# Patient Record
Sex: MALE | Race: OTHER | HISPANIC OR LATINO | URBAN - METROPOLITAN AREA
[De-identification: names, ages, dates, MRNs, and addresses within clinical notes are randomized per-mention and may not be internally consistent; named-entity substitution may affect disease eponyms.]

---

## 2020-02-16 ENCOUNTER — INPATIENT (INPATIENT)
Facility: HOSPITAL | Age: 40
LOS: 8 days | Discharge: NURSING, REHAB OR LONG-TERM CARE | End: 2020-02-25
Attending: INTERNAL MEDICINE | Admitting: INTERNAL MEDICINE
Payer: MEDICAID

## 2020-02-16 VITALS
HEART RATE: 128 BPM | SYSTOLIC BLOOD PRESSURE: 160 MMHG | DIASTOLIC BLOOD PRESSURE: 88 MMHG | RESPIRATION RATE: 18 BRPM | TEMPERATURE: 98 F | OXYGEN SATURATION: 98 %

## 2020-02-16 LAB
ALBUMIN SERPL ELPH-MCNC: 3.6 G/DL — SIGNIFICANT CHANGE UP (ref 3.5–5.2)
ALP SERPL-CCNC: 132 U/L — HIGH (ref 30–115)
ALT FLD-CCNC: 29 U/L — SIGNIFICANT CHANGE UP (ref 0–41)
ANION GAP SERPL CALC-SCNC: 20 MMOL/L — HIGH (ref 7–14)
ANISOCYTOSIS BLD QL: SLIGHT — SIGNIFICANT CHANGE UP
APPEARANCE UR: CLEAR — SIGNIFICANT CHANGE UP
AST SERPL-CCNC: 79 U/L — HIGH (ref 0–41)
BASOPHILS # BLD AUTO: 0.05 K/UL — SIGNIFICANT CHANGE UP (ref 0–0.2)
BASOPHILS NFR BLD AUTO: 0.9 % — SIGNIFICANT CHANGE UP (ref 0–1)
BILIRUB SERPL-MCNC: 4.7 MG/DL — HIGH (ref 0.2–1.2)
BILIRUB UR-MCNC: NEGATIVE — SIGNIFICANT CHANGE UP
BUN SERPL-MCNC: 5 MG/DL — LOW (ref 10–20)
CALCIUM SERPL-MCNC: 9.1 MG/DL — SIGNIFICANT CHANGE UP (ref 8.5–10.1)
CHLORIDE SERPL-SCNC: 101 MMOL/L — SIGNIFICANT CHANGE UP (ref 98–110)
CO2 SERPL-SCNC: 20 MMOL/L — SIGNIFICANT CHANGE UP (ref 17–32)
COLOR SPEC: YELLOW — SIGNIFICANT CHANGE UP
CREAT SERPL-MCNC: 0.7 MG/DL — SIGNIFICANT CHANGE UP (ref 0.7–1.5)
DIFF PNL FLD: NEGATIVE — SIGNIFICANT CHANGE UP
EOSINOPHIL # BLD AUTO: 0.05 K/UL — SIGNIFICANT CHANGE UP (ref 0–0.7)
EOSINOPHIL NFR BLD AUTO: 0.9 % — SIGNIFICANT CHANGE UP (ref 0–8)
ETHANOL SERPL-MCNC: <10 MG/DL — SIGNIFICANT CHANGE UP
GIANT PLATELETS BLD QL SMEAR: PRESENT — SIGNIFICANT CHANGE UP
GLUCOSE SERPL-MCNC: 92 MG/DL — SIGNIFICANT CHANGE UP (ref 70–99)
GLUCOSE UR QL: NEGATIVE — SIGNIFICANT CHANGE UP
HCT VFR BLD CALC: 37.3 % — LOW (ref 42–52)
HGB BLD-MCNC: 12.1 G/DL — LOW (ref 14–18)
KETONES UR-MCNC: ABNORMAL
LACTATE SERPL-SCNC: 3.9 MMOL/L — HIGH (ref 0.7–2)
LEUKOCYTE ESTERASE UR-ACNC: NEGATIVE — SIGNIFICANT CHANGE UP
LIDOCAIN IGE QN: 35 U/L — SIGNIFICANT CHANGE UP (ref 7–60)
LYMPHOCYTES # BLD AUTO: 0.5 K/UL — LOW (ref 1.2–3.4)
LYMPHOCYTES # BLD AUTO: 8.7 % — LOW (ref 20.5–51.1)
MACROCYTES BLD QL: SLIGHT — SIGNIFICANT CHANGE UP
MAGNESIUM SERPL-MCNC: 1.3 MG/DL — LOW (ref 1.8–2.4)
MANUAL SMEAR VERIFICATION: SIGNIFICANT CHANGE UP
MCHC RBC-ENTMCNC: 24.1 PG — LOW (ref 27–31)
MCHC RBC-ENTMCNC: 32.4 G/DL — SIGNIFICANT CHANGE UP (ref 32–37)
MCV RBC AUTO: 74.3 FL — LOW (ref 80–94)
MICROCYTES BLD QL: SLIGHT — SIGNIFICANT CHANGE UP
MONOCYTES # BLD AUTO: 0.15 K/UL — SIGNIFICANT CHANGE UP (ref 0.1–0.6)
MONOCYTES NFR BLD AUTO: 2.6 % — SIGNIFICANT CHANGE UP (ref 1.7–9.3)
NEUTROPHILS # BLD AUTO: 5.02 K/UL — SIGNIFICANT CHANGE UP (ref 1.4–6.5)
NEUTROPHILS NFR BLD AUTO: 86.9 % — HIGH (ref 42.2–75.2)
NITRITE UR-MCNC: NEGATIVE — SIGNIFICANT CHANGE UP
PH UR: 6.5 — SIGNIFICANT CHANGE UP (ref 5–8)
PLAT MORPH BLD: NORMAL — SIGNIFICANT CHANGE UP
PLATELET # BLD AUTO: 102 K/UL — LOW (ref 130–400)
POTASSIUM SERPL-MCNC: 3.8 MMOL/L — SIGNIFICANT CHANGE UP (ref 3.5–5)
POTASSIUM SERPL-SCNC: 3.8 MMOL/L — SIGNIFICANT CHANGE UP (ref 3.5–5)
PROT SERPL-MCNC: 8.1 G/DL — HIGH (ref 6–8)
PROT UR-MCNC: NEGATIVE — SIGNIFICANT CHANGE UP
RBC # BLD: 5.02 M/UL — SIGNIFICANT CHANGE UP (ref 4.7–6.1)
RBC # FLD: 21.2 % — HIGH (ref 11.5–14.5)
RBC BLD AUTO: ABNORMAL
SMUDGE CELLS # BLD: PRESENT — SIGNIFICANT CHANGE UP
SODIUM SERPL-SCNC: 141 MMOL/L — SIGNIFICANT CHANGE UP (ref 135–146)
SP GR SPEC: 1.04 — HIGH (ref 1.01–1.02)
UROBILINOGEN FLD QL: SIGNIFICANT CHANGE UP
WBC # BLD: 5.78 K/UL — SIGNIFICANT CHANGE UP (ref 4.8–10.8)
WBC # FLD AUTO: 5.78 K/UL — SIGNIFICANT CHANGE UP (ref 4.8–10.8)

## 2020-02-16 PROCEDURE — 99285 EMERGENCY DEPT VISIT HI MDM: CPT

## 2020-02-16 PROCEDURE — 76705 ECHO EXAM OF ABDOMEN: CPT | Mod: 26

## 2020-02-16 PROCEDURE — 74177 CT ABD & PELVIS W/CONTRAST: CPT | Mod: 26

## 2020-02-16 PROCEDURE — 93010 ELECTROCARDIOGRAM REPORT: CPT

## 2020-02-16 RX ORDER — DIAZEPAM 5 MG
5 TABLET ORAL ONCE
Refills: 0 | Status: DISCONTINUED | OUTPATIENT
Start: 2020-02-16 | End: 2020-02-16

## 2020-02-16 RX ORDER — SODIUM CHLORIDE 9 MG/ML
1000 INJECTION INTRAMUSCULAR; INTRAVENOUS; SUBCUTANEOUS ONCE
Refills: 0 | Status: COMPLETED | OUTPATIENT
Start: 2020-02-16 | End: 2020-02-16

## 2020-02-16 RX ORDER — MAGNESIUM SULFATE 500 MG/ML
2 VIAL (ML) INJECTION ONCE
Refills: 0 | Status: COMPLETED | OUTPATIENT
Start: 2020-02-16 | End: 2020-02-16

## 2020-02-16 RX ORDER — THIAMINE MONONITRATE (VIT B1) 100 MG
200 TABLET ORAL ONCE
Refills: 0 | Status: COMPLETED | OUTPATIENT
Start: 2020-02-16 | End: 2020-02-16

## 2020-02-16 RX ADMIN — Medication 200 MILLIGRAM(S): at 22:37

## 2020-02-16 RX ADMIN — SODIUM CHLORIDE 1000 MILLILITER(S): 9 INJECTION INTRAMUSCULAR; INTRAVENOUS; SUBCUTANEOUS at 20:45

## 2020-02-16 RX ADMIN — SODIUM CHLORIDE 1000 MILLILITER(S): 9 INJECTION INTRAMUSCULAR; INTRAVENOUS; SUBCUTANEOUS at 21:12

## 2020-02-16 RX ADMIN — Medication 2 GRAM(S): at 23:07

## 2020-02-16 RX ADMIN — SODIUM CHLORIDE 1000 MILLILITER(S): 9 INJECTION INTRAMUSCULAR; INTRAVENOUS; SUBCUTANEOUS at 21:45

## 2020-02-16 RX ADMIN — Medication 5 MILLIGRAM(S): at 19:45

## 2020-02-16 RX ADMIN — SODIUM CHLORIDE 1000 MILLILITER(S): 9 INJECTION INTRAMUSCULAR; INTRAVENOUS; SUBCUTANEOUS at 20:12

## 2020-02-16 RX ADMIN — Medication 50 GRAM(S): at 22:37

## 2020-02-16 NOTE — ED PROVIDER NOTE - CLINICAL SUMMARY MEDICAL DECISION MAKING FREE TEXT BOX
Patient remained hemodynamically stable in ED, improved well, as per sign out, US findings noted, and patient is admitted to Medicine for further evaluation.

## 2020-02-16 NOTE — ED PROVIDER NOTE - NS ED ROS FT
Constitutional: (-) fever  Eyes/ENT: (-) blurry vision, (-) epistaxis  Cardiovascular: (-) chest pain, (-) syncope  Respiratory: (-) cough, (-) shortness of breath  Gastrointestinal: (+) vomiting, (+) diarrhea, (+) epigastric  Musculoskeletal: (-) neck pain, (-) back pain, (-) joint pain  Integumentary: (-) rash, (-) edema  Neurological: (+) headache, (-) altered mental status  Psychiatric: (-) hallucinations  Allergic/Immunologic: (-) pruritus

## 2020-02-16 NOTE — ED PROVIDER NOTE - OBJECTIVE STATEMENT
The pt is a 39y M, undomiciled, w/ hx of alcohol abuse presenting with epigastric abdominal pain x1 week. Pain radiates to the back. Pt also endorses daily emesis and diarrhea. Reports drinking a 12 pack of beer daily plus hard liquor. Endorses headache. Denies fever, chest pain, SOB. He is not aware of any other medical hx but has not seen a doctor in many years. No surgical hx. No allergies. Never smoker.

## 2020-02-16 NOTE — ED PROVIDER NOTE - PHYSICAL EXAMINATION
Vital Signs: I have reviewed the initial vital signs. .   Constitutional: well-nourished, appears stated age, no acute distress  Cardiovascular: (+) tachycardic, regular rhythm, well-perfused extremities  Respiratory: unlabored respiratory effort, clear to auscultation bilaterally  Gastrointestinal: soft, (+) tender abdomen in the epigastric region and RLQ  Musculoskeletal: supple neck, no lower extremity edema  Integumentary: warm, dry, no rash  Neurologic: awake, alert, extremities’ motor and sensory functions grossly intact  Psychiatric: appropriate mood, appropriate affect

## 2020-02-16 NOTE — ED PROVIDER NOTE - PROGRESS NOTE DETAILS
ct with only fatty/cirrhotic liver however pt has elevated lft. will add on ruq. mag  2g ordered. s/o Dr. Hoffman re-eval and re-asses

## 2020-02-17 LAB
ALBUMIN SERPL ELPH-MCNC: 3.2 G/DL — LOW (ref 3.5–5.2)
ALP SERPL-CCNC: 110 U/L — SIGNIFICANT CHANGE UP (ref 30–115)
ALT FLD-CCNC: 25 U/L — SIGNIFICANT CHANGE UP (ref 0–41)
ANION GAP SERPL CALC-SCNC: 15 MMOL/L — HIGH (ref 7–14)
APTT BLD: 49.1 SEC — HIGH (ref 27–39.2)
AST SERPL-CCNC: 74 U/L — HIGH (ref 0–41)
BASOPHILS # BLD AUTO: 0.04 K/UL — SIGNIFICANT CHANGE UP (ref 0–0.2)
BASOPHILS NFR BLD AUTO: 1 % — SIGNIFICANT CHANGE UP (ref 0–1)
BILIRUB DIRECT SERPL-MCNC: 1.5 MG/DL — HIGH (ref 0–0.2)
BILIRUB INDIRECT FLD-MCNC: 3.1 MG/DL — HIGH (ref 0.2–1.2)
BILIRUB SERPL-MCNC: 4.6 MG/DL — HIGH (ref 0.2–1.2)
BLD GP AB SCN SERPL QL: SIGNIFICANT CHANGE UP
BUN SERPL-MCNC: 5 MG/DL — LOW (ref 10–20)
CALCIUM SERPL-MCNC: 8 MG/DL — LOW (ref 8.5–10.1)
CHLORIDE SERPL-SCNC: 103 MMOL/L — SIGNIFICANT CHANGE UP (ref 98–110)
CO2 SERPL-SCNC: 20 MMOL/L — SIGNIFICANT CHANGE UP (ref 17–32)
CREAT SERPL-MCNC: 0.6 MG/DL — LOW (ref 0.7–1.5)
EOSINOPHIL # BLD AUTO: 0.03 K/UL — SIGNIFICANT CHANGE UP (ref 0–0.7)
EOSINOPHIL NFR BLD AUTO: 0.8 % — SIGNIFICANT CHANGE UP (ref 0–8)
GGT SERPL-CCNC: 135 U/L — HIGH (ref 1–40)
GLUCOSE SERPL-MCNC: 114 MG/DL — HIGH (ref 70–99)
HCT VFR BLD CALC: 34.3 % — LOW (ref 42–52)
HGB BLD-MCNC: 11.3 G/DL — LOW (ref 14–18)
IMM GRANULOCYTES NFR BLD AUTO: 0.5 % — HIGH (ref 0.1–0.3)
INR BLD: 1.73 RATIO — HIGH (ref 0.65–1.3)
IRON SATN MFR SERPL: 299 UG/DL — HIGH (ref 35–150)
LYMPHOCYTES # BLD AUTO: 0.79 K/UL — LOW (ref 1.2–3.4)
LYMPHOCYTES # BLD AUTO: 19.8 % — LOW (ref 20.5–51.1)
MAGNESIUM SERPL-MCNC: 2.4 MG/DL — SIGNIFICANT CHANGE UP (ref 1.8–2.4)
MCHC RBC-ENTMCNC: 24.2 PG — LOW (ref 27–31)
MCHC RBC-ENTMCNC: 32.9 G/DL — SIGNIFICANT CHANGE UP (ref 32–37)
MCV RBC AUTO: 73.6 FL — LOW (ref 80–94)
MONOCYTES # BLD AUTO: 0.35 K/UL — SIGNIFICANT CHANGE UP (ref 0.1–0.6)
MONOCYTES NFR BLD AUTO: 8.8 % — SIGNIFICANT CHANGE UP (ref 1.7–9.3)
NEUTROPHILS # BLD AUTO: 2.75 K/UL — SIGNIFICANT CHANGE UP (ref 1.4–6.5)
NEUTROPHILS NFR BLD AUTO: 69.1 % — SIGNIFICANT CHANGE UP (ref 42.2–75.2)
NRBC # BLD: 0 /100 WBCS — SIGNIFICANT CHANGE UP (ref 0–0)
PHOSPHATE SERPL-MCNC: 2.7 MG/DL — SIGNIFICANT CHANGE UP (ref 2.1–4.9)
PLATELET # BLD AUTO: 64 K/UL — LOW (ref 130–400)
POTASSIUM SERPL-MCNC: 3.3 MMOL/L — LOW (ref 3.5–5)
POTASSIUM SERPL-SCNC: 3.3 MMOL/L — LOW (ref 3.5–5)
PROT SERPL-MCNC: 7.1 G/DL — SIGNIFICANT CHANGE UP (ref 6–8)
PROTHROM AB SERPL-ACNC: 19.9 SEC — HIGH (ref 9.95–12.87)
RBC # BLD: 4.66 M/UL — LOW (ref 4.7–6.1)
RBC # FLD: 21 % — HIGH (ref 11.5–14.5)
SODIUM SERPL-SCNC: 138 MMOL/L — SIGNIFICANT CHANGE UP (ref 135–146)
TIBC SERPL-MCNC: <316 UG/DL — SIGNIFICANT CHANGE UP (ref 220–430)
TRANSFERRIN SERPL-MCNC: 273 MG/DL — SIGNIFICANT CHANGE UP (ref 200–360)
UIBC SERPL-MCNC: <17 UG/DL — LOW (ref 110–370)
WBC # BLD: 3.98 K/UL — LOW (ref 4.8–10.8)
WBC # FLD AUTO: 3.98 K/UL — LOW (ref 4.8–10.8)

## 2020-02-17 PROCEDURE — 99223 1ST HOSP IP/OBS HIGH 75: CPT

## 2020-02-17 PROCEDURE — 99223 1ST HOSP IP/OBS HIGH 75: CPT | Mod: AI

## 2020-02-17 RX ORDER — PANTOPRAZOLE SODIUM 20 MG/1
40 TABLET, DELAYED RELEASE ORAL
Refills: 0 | Status: DISCONTINUED | OUTPATIENT
Start: 2020-02-17 | End: 2020-02-17

## 2020-02-17 RX ORDER — FOLIC ACID 0.8 MG
1 TABLET ORAL DAILY
Refills: 0 | Status: DISCONTINUED | OUTPATIENT
Start: 2020-02-17 | End: 2020-02-25

## 2020-02-17 RX ORDER — THIAMINE MONONITRATE (VIT B1) 100 MG
100 TABLET ORAL DAILY
Refills: 0 | Status: COMPLETED | OUTPATIENT
Start: 2020-02-17 | End: 2020-02-19

## 2020-02-17 RX ORDER — MAGNESIUM SULFATE 500 MG/ML
2 VIAL (ML) INJECTION
Refills: 0 | Status: COMPLETED | OUTPATIENT
Start: 2020-02-17 | End: 2020-02-17

## 2020-02-17 RX ORDER — PANTOPRAZOLE SODIUM 20 MG/1
40 TABLET, DELAYED RELEASE ORAL
Refills: 0 | Status: DISCONTINUED | OUTPATIENT
Start: 2020-02-17 | End: 2020-02-25

## 2020-02-17 RX ORDER — LACTULOSE 10 G/15ML
15 SOLUTION ORAL
Refills: 0 | Status: DISCONTINUED | OUTPATIENT
Start: 2020-02-17 | End: 2020-02-25

## 2020-02-17 RX ORDER — POTASSIUM CHLORIDE 20 MEQ
20 PACKET (EA) ORAL
Refills: 0 | Status: COMPLETED | OUTPATIENT
Start: 2020-02-17 | End: 2020-02-17

## 2020-02-17 RX ORDER — ENOXAPARIN SODIUM 100 MG/ML
40 INJECTION SUBCUTANEOUS DAILY
Refills: 0 | Status: DISCONTINUED | OUTPATIENT
Start: 2020-02-17 | End: 2020-02-24

## 2020-02-17 RX ORDER — SODIUM CHLORIDE 9 MG/ML
1000 INJECTION, SOLUTION INTRAVENOUS
Refills: 0 | Status: DISCONTINUED | OUTPATIENT
Start: 2020-02-17 | End: 2020-02-25

## 2020-02-17 RX ORDER — THIAMINE MONONITRATE (VIT B1) 100 MG
100 TABLET ORAL ONCE
Refills: 0 | Status: COMPLETED | OUTPATIENT
Start: 2020-02-17 | End: 2020-02-17

## 2020-02-17 RX ADMIN — Medication 100 MILLIGRAM(S): at 11:37

## 2020-02-17 RX ADMIN — Medication 2 MILLIGRAM(S): at 13:20

## 2020-02-17 RX ADMIN — Medication 1 TABLET(S): at 11:37

## 2020-02-17 RX ADMIN — PANTOPRAZOLE SODIUM 40 MILLIGRAM(S): 20 TABLET, DELAYED RELEASE ORAL at 18:12

## 2020-02-17 RX ADMIN — Medication 2 MILLIGRAM(S): at 06:04

## 2020-02-17 RX ADMIN — Medication 20 MILLIEQUIVALENT(S): at 11:36

## 2020-02-17 RX ADMIN — Medication 20 MILLIEQUIVALENT(S): at 13:20

## 2020-02-17 RX ADMIN — Medication 2 MILLIGRAM(S): at 01:29

## 2020-02-17 RX ADMIN — Medication 2 MILLIGRAM(S): at 18:12

## 2020-02-17 RX ADMIN — Medication 20 MILLIEQUIVALENT(S): at 18:12

## 2020-02-17 RX ADMIN — Medication 50 GRAM(S): at 04:00

## 2020-02-17 RX ADMIN — SODIUM CHLORIDE 75 MILLILITER(S): 9 INJECTION, SOLUTION INTRAVENOUS at 04:32

## 2020-02-17 RX ADMIN — Medication 50 GRAM(S): at 02:00

## 2020-02-17 RX ADMIN — Medication 2 MILLIGRAM(S): at 10:13

## 2020-02-17 RX ADMIN — Medication 1 MILLIGRAM(S): at 11:37

## 2020-02-17 RX ADMIN — Medication 2 MILLIGRAM(S): at 04:11

## 2020-02-17 NOTE — CONSULT NOTE ADULT - SUBJECTIVE AND OBJECTIVE BOX
39-year-old,  Nauruan speaking, male, undomiciled, unemployed, single, without children, without known medical history, no formal past psychiatric history - admitted to medicine for abdominal pain and alcohol withdrawal with imaging findings consistent with cirrhosis.     Interview via pacific  Nauruan language telephonic interpretation ID #[id number]    Subjective   Patient endorses drinking 10-12 beers, daily, for the last 10 years. Endorses subsequent unemployment, financial stressors, and having no where to live as a result of this lifestyle, which he endorses as being unable to stop despite no desire to continue to consume alcohol. He endorses depressed mood for last decade as a consequence of feelings of dependency and hopelessness regarding his compulsive alcohol use. He denies suicidality at time. Endorses sleep difficulties. Denies sx of psychosis.     Objective     CIWA calculated to be 4.     Vital Signs Last 24 Hrs  T(C): 36.6 (17 Feb 2020 08:00), Max: 37 (17 Feb 2020 04:41)  T(F): 97.9 (17 Feb 2020 08:00), Max: 98.6 (17 Feb 2020 04:41)  HR: 102 (17 Feb 2020 08:00) (91 - 128)  BP: 136/77 (17 Feb 2020 08:00) (136/77 - 160/88)  RR: 18 (17 Feb 2020 08:00) (18 - 18)  SpO2: 98% (17 Feb 2020 08:00) (98% - 100%)    CT Abdomen and Pelvis w/ IV Cont (02.16.20 @ 20:19) >  IMPRESSION:   1.  No CT evidence of acute abdominopelvic pathology.  2.  Severe fatty infiltrated nodular cirrhotic liver.    Mental status: Fairly groomed, cooperative with interview, Nauruan speaking, linear thought process, depressed appearing, and reports depressed mood, no evidence of perceptual abnormalities, oriented to person, place, time, and situation, no suicidality.

## 2020-02-17 NOTE — H&P ADULT - NSHPPHYSICALEXAM_GEN_ALL_CORE
General : Patient is alert, no acute distress, no agitation noted   Eyes / Neuro : Mild bilateral tremor, no diaphoresis, no nystagmus, PERLLA, EOMI, + Jaundice    Lungs : Clear to auscultation bilaterally, unlabored breathing, on room air   Cardiovascular : Regular S1S2  Abdomen : Soft with mild epigastric tenderness, + Hepatosplenomegaly, no sequelae of chronic liver disease, no guarding   Extremities : No edema, no cyanosis

## 2020-02-17 NOTE — CONSULT NOTE ADULT - ASSESSMENT
39-year-old,  Jordanian speaking, male, undomiciled, unemployed, single, without children, without known medical history, no formal past psychiatric history - admitted to medicine for abdominal pain and alcohol withdrawal with imaging findings consistent with cirrhosis.     *Based on assessment, patient appears to have alcohol use disorder, severe, currently detoxifying.   *Ativan taper appears to be controlling symptoms, recommend continuing. Would monitor vitals at least q4 hours to monitor for signs of withdrawal (last documented vitals 7 hours ago with tachycardia to 102).   *Patient is amenable to rehabilitation after detoxification. If possible, Jordanian speaking rehabilitation will be most effective, patient is not English speaking.   *Recommend social work evaluation.   *Frequent CIWA Checks, patient is high risk given duration of use and amount consumed.

## 2020-02-17 NOTE — H&P ADULT - ATTENDING COMMENTS
**Please speak to the pt in Korean, it is his preferred language.     38 YO M with a PMH of EtOH abuse and homelessness who presents to the hospital with a c/o epigastric pain for the past x 1 wk. Described as "strong", radiates towards left lower back, and waxes/wanes. Denies any hematemesis, hematuria, black/bloody stools, fever/chills, rashes, CP, or SOB. Of note, pt drinks 10 beers + 3 cups of vodka daily and his lst drink was yesterday in the afternoon. In the ED, pt given IVFs (NS), Diazepam, thiamine/folate, and Mg2+. Physical exam shows pt in NAD. VSS, afebrile. No tongue fasciculations. B/L upper extremity fine tremors present. CTA B/L with no W/C/R. RRR. ABD is soft and non-tender, hepatomegaly present. LEs without swelling. Labs and radiology as above. Epigastric pain likely due to alcoholic gastritis. PPI. Anti-emetics PRN. EtOH abstinence counseling. EtOH abuse with risk for withdrawal. CIWA protocol. Ativan PRN. Long-acting benzos contraindicated due to liver cirrhosis. IVFs (LR). Thiamine/Folate deficiency. Replace. Mixed picture: HAGMA + metabolic alkalosis. From lactate and starvation ketosis. IVFs (LR). Trend BMP. Hypomagnesemia. Replace. Elevated tbili and thrombocytopenia, likely due to EtOH cirrhosis. Send coag panel to calculate Maddreys Discriminant Function. IVFs (LR). Trend LFTs. Send CLD labs. Microcytic anemia. Send anemia work-up. HX of homelessness. Social consult. GI and DVT PPX. Rest as per above note.

## 2020-02-17 NOTE — CHART NOTE - NSCHARTNOTEFT_GEN_A_CORE
used: Betty [id number] ID NO      He presents with diffuse abdominal discomfort and on the Imaging studies has been diagnosed  with NEW cirrhosis.    -He notes of vomiting 2 days ago and it was bloody  -Sometimes also has the bloody bowel movements.  -Had one episode of confusion requiring the Hospital admission  Hepatic Encephalopathy??  -No tremors, no Caput Medusae, No spider angiomata.  -MELD Na Score,  used: Betty [id number] ID NO      He presents with diffuse abdominal discomfort and on the Imaging studies has been diagnosed  with NEW cirrhosis, Most Likely Alcoholic Cirrhosis.  -Drinks 12 beer everyday and few shots of Vodka everyday since last 10 years,   last drink yesterday currently being treated for the Alcohol Withdrawal.     -He notes of vomiting 2 days ago and it was bloody  -Sometimes also has the bloody bowel movements.  -Had one episode of confusion requiring the Hospital admission  Hepatic Encephalopathy??  -No tremors, no Caput Medusae, No spider angiomata.  -MELD Na Score, 19    PLAN:  -Keep 2 18G IV lines  -Active Type and Screen  -Follow up the LFTs  -Avoid Hepatotoxic Meds  -IV PPI  -GI consult  -Will need Diagnostic EGD and Possible Colonoscopy  -Monitor H and H  -For episode of Confusion will start Lactulose  -Monitor CIWA  -Follow up with the CLD workup  used: Betty [id number] ID NO      He presents with diffuse abdominal discomfort and on the Imaging studies has been diagnosed  with NEW cirrhosis, Most Likely Alcoholic Cirrhosis.  -Drinks 12 beer everyday and few shots of Vodka everyday since last 10 years,   last drink yesterday currently being treated for the Alcohol Withdrawal.     -He notes of vomiting 2 days ago and it was bloody  -Sometimes also has the bloody bowel movements.  -Had one episode of confusion requiring the Hospital admission  Hepatic Encephalopathy??  -No tremors, no Caput Medusae, No spider angiomata.  -MELD Na Score, 19    PLAN:  -Keep 2 18G IV lines  -Active Type and Screen  -Follow up the LFTs  -Avoid Hepatotoxic Meds  -IV PPI  -GI consult  -Will need Diagnostic EGD and Possible Colonoscopy  -Monitor H and H  -For episode of Confusion will start Lactulose  -Monitor CIWA  -Follow up with the CLD workup  -In light of Increased Indirect bilirubin and cirrhosis in young patient  will keep Drew disease in a differential Diagnosis.  -Will get the Hemolysis panel

## 2020-02-17 NOTE — CONSULT NOTE ADULT - ASSESSMENT
39 year old male patient, homeless, active alcohol abuse, no other known past medical history presenting for epigastric pain and vomiting for the last 2 days.    # Abdominal pain/diarrhea/vomiting/elevated LFTS:  Alcoholic liver cirrhosis (alcohol, thrombocytopenia, elevated LFTS, AST/ALT ratio reversal, US abdomen showed nodular liver)  possible acute alcoholic hepatitis (abdominal pain, no leucocytosis but alcoholic might have pancytopenia, vomiting, active alcohol abuse, subjective fever)  Mental status intact, Coagulopathy +  MELD score 18  MADDREY score: 40.9      Rec:  Please perform a CLD work up which includes HAV IgM/IgG, HBsAg, HBsAb, HBcAb IgM/IgG, HCV Ab, Anti HEV, Serum Ferritin, Transferrin Saturation, Ceruloplasmin level, MARCUS, SMA, gamma globulin, AMA.  Please trend liver enzymes and INR daily.  Monitor electrolytes, mental status.    Alcohol abuse:  Ativan protocol for alcohol withdrawal.  RL at 75cc/hr  Folate, MVI and thiamine  Alcohol cessation  c/w DASH diet.    # Vomiting with blood tinge : Likely Aziza Chau vs PUD vs erosive esophagitis vs erosive gastritis/duodenitis   THONG: brown stools  Hemodynamically stable  No active bleeding  Slight drop in HG    Rec:  Needs EGD after medical stabilization for screening varices also   Start on protonix 40mg BID  Trend CBC q12H  can c/w DASH diet      # Esophageal varices  needs screening EGD    #No ascites on US abdomen    No Known H/O HE      Coagulopathy:  Monitor INR    HRS: No H/O HRS and Cr normal    # HCC screening:  get AFP now  CT abdomen with con: No lesions in liver for HCC    # Lifestyle modifications  Calorie intake 25-40 Kcal/kg/day  Protein intake: 1.2-1.5 gm/kg/day  Avoid smoking, alcohol, NSAIDS.    #Vaccinations:  Please f/u in clinic for being uptodate with HAV/HBV/Influenza/Pneumococcal vaccines. 39 year old male patient, homeless, active alcohol abuse, no other known past medical history presenting for epigastric pain and vomiting for the last 2 days.    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  Please trend liver enzymes, CMP, INR daily.  Monitor electrolytes, mental status.  will start Prednisolone 40mg daily for alcoholic hepatitis.  R/O infection Blood cx, CXR (UA is neg)    Alcohol abuse:  Ativan protocol for alcohol withdrawal.  RL at 75cc/hr  Folate, MVI and thiamine  Alcohol cessation  c/w DASH diet.    # Vomiting with blood tinge : Likely Aziza Chau vs PUD vs erosive esophagitis vs erosive gastritis/duodenitis   THONG: brown stools  Hemodynamically stable  No active bleeding  Slight drop in HG    Rec:  Needs EGD after medical stabilization before discharge  Start on protonix 40mg BID  Trend CBC q12H  can c/w DASH diet      # Esophageal varices  needs screening EGD    #No ascites on US abdomen    No Known H/O HE      Coagulopathy:  Monitor INR    HRS: No H/O HRS and Cr normal    # HCC screening:  get AFP now  CT abdomen with con: No lesions in liver for HCC    # Lifestyle modifications  Calorie intake 25-40 Kcal/kg/day  Protein intake: 1.2-1.5 gm/kg/day  Avoid smoking, alcohol, NSAIDS.    #Vaccinations:  Please f/u in clinic for being uptodate with HAV/HBV/Influenza/Pneumococcal vaccines.

## 2020-02-17 NOTE — CONSULT NOTE ADULT - SUBJECTIVE AND OBJECTIVE BOX
Gastroenterology Consultation:    Patient is a 39y old  Male who presents with a chief complaint of Alcohol abuse (17 Feb 2020 01:26)      Admitted on: 02-17-20    HPI:    39 year old male patient, chronic alcohol abuse, homeless, no other known past medical history presenting for epigastric pain.   The patient has been drinking a 12 pack beer daily as well as hard liquor (3 cups of Vodka daily)   He states that he has been experiencing epigastric pain for a week, radiating to the left side of the back, very severe, patient unsure of the quality of the pain, accompanied with subjective fever as well as diarrhea, bilious emesis and nausea. He otherwise denies hematemesis, hematochezia, melena, dyspnea, cough. He reports a chest pressure sensation that has been present for a month and that is exacerbated by PO intake.     In the ED , patient was found to be in alcohol withdrawal, tachycardic up to 128, and hypertensive, no fever, and CIWA protocol initiated, CIWA score of 4 noted, patient was started on Ativan taper, Thiamine, Folic acid and multivitamins (17 Feb 2020 01:26)      Prior EGD/Colonoscopy: No reports in system (Never had one)      PAST MEDICAL & SURGICAL HISTORY:  Chronic alcohol abuse  No significant past surgical history      FAMILY HISTORY:  No pertinent family history in first degree relatives      Social History:  Alcohol abuse    Home Medications:    MEDICATIONS  (STANDING):  enoxaparin Injectable 40 milliGRAM(s) SubCutaneous daily  folic acid 1 milliGRAM(s) Oral daily  lactated ringers. 1000 milliLiter(s) (75 mL/Hr) IV Continuous <Continuous>  lactulose Syrup 15 Gram(s) Oral two times a day  LORazepam   Injectable 2 milliGRAM(s) IV Push every 4 hours  LORazepam   Injectable 1.5 milliGRAM(s) IV Push every 4 hours  LORazepam   Injectable   IV Push   multivitamin 1 Tablet(s) Oral daily  pantoprazole  Injectable 40 milliGRAM(s) IV Push two times a day  potassium chloride    Tablet ER 20 milliEquivalent(s) Oral every 2 hours  thiamine 100 milliGRAM(s) Oral daily  thiamine Injectable 100 milliGRAM(s) IntraMuscular once    MEDICATIONS  (PRN):      Allergies  Allergy Status Unknown      Review of Systems:   Constitutional:  No Fever, No Chills  ENT/Mouth:  No Hearing Changes,  No Difficulty Swallowing  Eyes:  No Eye Pain, No Vision Changes  Cardiovascular:  No Chest Pain, No Palpitations  Respiratory:  No Cough, No Dyspnea  Gastrointestinal:  As described in HPI  Musculoskeletal:  No Joint Swelling, No Back Pain  Skin:  No Skin Lesions, No Jaundice  Neuro:  No Syncope, No Dizziness  Heme/Lymph:  No Bruising, No Bleeding.          Physical Examination:  T(C): 36.6 (02-17-20 @ 08:00), Max: 37 (02-17-20 @ 04:41)  HR: 102 (02-17-20 @ 08:00) (91 - 128)  BP: 136/77 (02-17-20 @ 08:00) (136/77 - 160/88)  RR: 18 (02-17-20 @ 08:00) (18 - 18)  SpO2: 98% (02-17-20 @ 08:00) (98% - 100%)        Constitutional: No acute distress.  Eyes:. Conjunctivae are clear, Sclera is non-icteric.  Ears Nose and Throat: The external ears are normal appearing,  Oral mucosa is pink and moist.  Respiratory:  No signs of respiratory distress. Lung sounds are clear bilaterally.  Cardiovascular:  S1 S2, Regular rate and rhythm.  GI: Abdomen is soft, symmetric, and non-tender without distention. There are no visible lesions. Bowel sounds are present and normoactive in all four quadrants. No masses, hepatomegaly, or splenomegaly are noted.   Neuro: No Tremor, No involuntary movements  Skin: No rashes, No Jaundice.          Data: (reviewed by attending)                        11.3   3.98  )-----------( 64       ( 17 Feb 2020 07:32 )             34.3     Hgb Trend:  11.3  02-17-20 @ 07:32  12.1  02-16-20 @ 19:50      02-17    138  |  103  |  5<L>  ----------------------------<  114<H>  3.3<L>   |  20  |  0.6<L>    Ca    8.0<L>      17 Feb 2020 07:32  Phos  2.7     02-17  Mg     2.4     02-17    TPro  7.1  /  Alb  3.2<L>  /  TBili  4.6<H>  /  DBili  1.5<H>  /  AST  74<H>  /  ALT  25  /  AlkPhos  110  02-17    Liver panel trend:  TBili 4.6   /   AST 74   /   ALT 25   /   AlkP 110   /   Tptn 7.1   /   Alb 3.2    /   DBili 1.5      02-17  TBili 4.7   /   AST 79   /   ALT 29   /   AlkP 132   /   Tptn 8.1   /   Alb 3.6    /   DBili --      02-16      PT/INR - ( 17 Feb 2020 07:32 )   PT: 19.90 sec;   INR: 1.73 ratio         PTT - ( 17 Feb 2020 07:32 )  PTT:49.1 sec        Radiology:(reviewed by attending)  CT Abdomen and Pelvis w/ IV Cont:   EXAM:  CT ABDOMEN AND PELVIS IC            PROCEDURE DATE:  02/16/2020            INTERPRETATION:  CLINICAL STATEMENT: Epigastric abdominal pain. Alcohol abuse.    TECHNIQUE: Contiguous axial CT images were obtained from the lower chest to the pubic symphysis following administration of 100cc Optiray 320 intravenous contrast.  Oral contrast was not administered.  Reformatted images in the coronal and sagittal planes were acquired.    COMPARISON CT: None.    OTHER STUDIES USED FOR CORRELATION: None.       FINDINGS:    LOWER CHEST: Unremarkable.    HEPATOBILIARY: Diffusely fatty infiltrated liver with nodular surface contour suggestive of cirrhosis.    SPLEEN: Unremarkable.    PANCREAS: Unremarkable.    ADRENAL GLANDS: Unremarkable.    KIDNEYS: Unremarkable.    ABDOMINOPELVIC NODES: Unremarkable.    PELVIC ORGANS: Unremarkable.    PERITONEUM/MESENTERY/BOWEL: Unremarkable.    BONES/SOFT TISSUES: Mild osteophytosis in the lower thoracic spine.    OTHER: Small fat-containing umbilical hernia      IMPRESSION:   1.  No CT evidence of acute abdominopelvic pathology.  2.  Severe fatty infiltrated nodular cirrhotic liver.                  ANNEMARIE GODOY M.D., ATTENDING RADIOLOGIST  This document has been electronically signed. Feb 16 2020  9:20PM             (02-16-20 @ 20:19)    US Abdomen Limited:   EXAM:  US ABDOMEN LIMITED            PROCEDURE DATE:  02/16/2020            INTERPRETATION:  CLINICAL HISTORY: Right upper quadrant pain.    COMPARISON: CT dated 2/16/2020    PROCEDURE: Ultrasound of the right upper quadrant was performed.    FINDINGS:    LIVER: Nodular contour of the liver without definite hepatic mass.. No focal mass.    GALLBLADDER/BILIARY TREE: The gallbladder is partially obscured without definite cholelithiasis or gallbladder wall thickening. There is a negative sonographic Rizvi's sign. The common bile duct measures 5 mm, which is normal.     PANCREAS: Obscured by overlying bowel gas.    KIDNEY:  Right kidney measures 11.5 cm in length. No hydronephrosis, calculi or solid mass.    AORTA/IVC:  Visualized proximal portions unremarkable.    ASCITES:  None.    IMPRESSION:    Nodular contour of the liver raising a question of cirrhosis.    Suboptimally imaged gallbladder without definite cholelithiasis or wall thickening.                  MOLLY JHA M.D., ATTENDING RADIOLOGIST  This document has been electronically signed. Feb 16 2020 10:45PM             (02-16-20 @ 22:41) Gastroenterology Consultation:    Patient is a 39y old  Male who presents with a chief complaint of Alcohol abuse (17 Feb 2020 01:26)      Admitted on: 02-17-20    HPI:    39 year old male patient, homeless, active alcohol abuse, no other known past medical history presenting for epigastric pain and vomiting. Patient states that he drinks around 10-12 beers and 3 cups of vodka daily everyday for the last 10 yrs and for the last 2 days he is been having epigastric pain, sharp, constant, radiating to RUQ and worsen with food and alcohol. He has a/s nausea, yellow color vomiting 2-3 episodes and he saw some specks of blood and the last episode was yesterday. His last BM was yesterday which was loose and brown. He otherwise denies hematochezia, melena, dyspnea, cough.  In the ED , patient was found to be in alcohol withdrawal, tachycardic up to 128, and hypertensive, no fever, and CIWA protocol initiated, CIWA score of 4 noted.      Prior EGD/Colonoscopy: No reports in system (Never had one)      PAST MEDICAL & SURGICAL HISTORY:  Chronic alcohol abuse  No significant past surgical history      FAMILY HISTORY:  No pertinent family history in first degree relatives      Social History:  Alcohol abuse    Home Medications: None    MEDICATIONS  (STANDING):  enoxaparin Injectable 40 milliGRAM(s) SubCutaneous daily  folic acid 1 milliGRAM(s) Oral daily  lactated ringers. 1000 milliLiter(s) (75 mL/Hr) IV Continuous <Continuous>  lactulose Syrup 15 Gram(s) Oral two times a day  LORazepam   Injectable 2 milliGRAM(s) IV Push every 4 hours  LORazepam   Injectable 1.5 milliGRAM(s) IV Push every 4 hours  LORazepam   Injectable   IV Push   multivitamin 1 Tablet(s) Oral daily  pantoprazole  Injectable 40 milliGRAM(s) IV Push two times a day  potassium chloride    Tablet ER 20 milliEquivalent(s) Oral every 2 hours  thiamine 100 milliGRAM(s) Oral daily  thiamine Injectable 100 milliGRAM(s) IntraMuscular once    MEDICATIONS  (PRN):      Allergies  Allergy Status Unknown      Review of Systems:   Constitutional:  Chills+  ENT/Mouth:  No Hearing Changes,  No Difficulty Swallowing  Eyes:  No Eye Pain, No Vision Changes  Cardiovascular:  No Chest Pain, No Palpitations  Respiratory:  No Cough, No Dyspnea  Gastrointestinal:  As described in HPI  Musculoskeletal:  No Joint Swelling, No Back Pain  Skin: Jaundice+  Neuro:  No Syncope, No Dizziness  Heme/Lymph:  No Bruising          Physical Examination:  T(C): 36.6 (02-17-20 @ 08:00), Max: 37 (02-17-20 @ 04:41)  HR: 102 (02-17-20 @ 08:00) (91 - 128)  BP: 136/77 (02-17-20 @ 08:00) (136/77 - 160/88)  RR: 18 (02-17-20 @ 08:00) (18 - 18)  SpO2: 98% (02-17-20 @ 08:00) (98% - 100%)        Constitutional: Looks restless, disheveled   Eyes: Sclera is icteric.  Ears Nose and Throat: The external ears are normal appearing,  Oral mucosa is pink and moist.  Respiratory:  No signs of respiratory distress. Lung sounds are clear bilaterally.  Cardiovascular:  S1 S2, Regular rate and rhythm.  GI: Abdomen is soft, symmetric, and non-tender without distention. There are no visible lesions. Bowel sounds are present and normoactive in all four quadrants. No masses, hepatomegaly, or splenomegaly are noted.   THONG: brown stools  Neuro: Fine tremors noted on extending hand but no asterixis, AAO X 3  Skin: Jaundice.          Data: (reviewed by attending)                        11.3   3.98  )-----------( 64       ( 17 Feb 2020 07:32 )             34.3     Hgb Trend:  11.3  02-17-20 @ 07:32  12.1  02-16-20 @ 19:50      02-17    138  |  103  |  5<L>  ----------------------------<  114<H>  3.3<L>   |  20  |  0.6<L>    Ca    8.0<L>      17 Feb 2020 07:32  Phos  2.7     02-17  Mg     2.4     02-17    TPro  7.1  /  Alb  3.2<L>  /  TBili  4.6<H>  /  DBili  1.5<H>  /  AST  74<H>  /  ALT  25  /  AlkPhos  110  02-17    Liver panel trend:  TBili 4.6   /   AST 74   /   ALT 25   /   AlkP 110   /   Tptn 7.1   /   Alb 3.2    /   DBili 1.5      02-17  TBili 4.7   /   AST 79   /   ALT 29   /   AlkP 132   /   Tptn 8.1   /   Alb 3.6    /   DBili --      02-16      PT/INR - ( 17 Feb 2020 07:32 )   PT: 19.90 sec;   INR: 1.73 ratio         PTT - ( 17 Feb 2020 07:32 )  PTT:49.1 sec        Radiology:(reviewed by attending)  CT Abdomen and Pelvis w/ IV Cont:   EXAM:  CT ABDOMEN AND PELVIS IC            PROCEDURE DATE:  02/16/2020            INTERPRETATION:  CLINICAL STATEMENT: Epigastric abdominal pain. Alcohol abuse.    TECHNIQUE: Contiguous axial CT images were obtained from the lower chest to the pubic symphysis following administration of 100cc Optiray 320 intravenous contrast.  Oral contrast was not administered.  Reformatted images in the coronal and sagittal planes were acquired.    COMPARISON CT: None.    OTHER STUDIES USED FOR CORRELATION: None.       FINDINGS:    LOWER CHEST: Unremarkable.    HEPATOBILIARY: Diffusely fatty infiltrated liver with nodular surface contour suggestive of cirrhosis.    SPLEEN: Unremarkable.    PANCREAS: Unremarkable.    ADRENAL GLANDS: Unremarkable.    KIDNEYS: Unremarkable.    ABDOMINOPELVIC NODES: Unremarkable.    PELVIC ORGANS: Unremarkable.    PERITONEUM/MESENTERY/BOWEL: Unremarkable.    BONES/SOFT TISSUES: Mild osteophytosis in the lower thoracic spine.    OTHER: Small fat-containing umbilical hernia      IMPRESSION:   1.  No CT evidence of acute abdominopelvic pathology.  2.  Severe fatty infiltrated nodular cirrhotic liver.                  ANNEMARIE GODOY M.D., ATTENDING RADIOLOGIST  This document has been electronically signed. Feb 16 2020  9:20PM             (02-16-20 @ 20:19)    US Abdomen Limited:   EXAM:  US ABDOMEN LIMITED            PROCEDURE DATE:  02/16/2020            INTERPRETATION:  CLINICAL HISTORY: Right upper quadrant pain.    COMPARISON: CT dated 2/16/2020    PROCEDURE: Ultrasound of the right upper quadrant was performed.    FINDINGS:    LIVER: Nodular contour of the liver without definite hepatic mass.. No focal mass.    GALLBLADDER/BILIARY TREE: The gallbladder is partially obscured without definite cholelithiasis or gallbladder wall thickening. There is a negative sonographic Rizvi's sign. The common bile duct measures 5 mm, which is normal.     PANCREAS: Obscured by overlying bowel gas.    KIDNEY:  Right kidney measures 11.5 cm in length. No hydronephrosis, calculi or solid mass.    AORTA/IVC:  Visualized proximal portions unremarkable.    ASCITES:  None.    IMPRESSION:    Nodular contour of the liver raising a question of cirrhosis.    Suboptimally imaged gallbladder without definite cholelithiasis or wall thickening.                  MOLLY JHA M.D., ATTENDING RADIOLOGIST  This document has been electronically signed. Feb 16 2020 10:45PM             (02-16-20 @ 22:41) Gastroenterology Consultation:    Patient is a 39y old  Male who presents with a chief complaint of Alcohol abuse (17 Feb 2020 01:26)      Admitted on: 02-17-20    HPI:    39 year old male patient, homeless, active alcohol abuse, no other known past medical history presenting for epigastric pain and vomiting. Patient states that he drinks around 10-12 beers and 3 cups of vodka daily everyday for the last 10 yrs and for the last 2 days he is been having epigastric pain, sharp, constant, radiating to RUQ and worsen with food and alcohol. He has a/s nausea, yellow color vomiting 2-3 episodes and he saw some specks of blood and the last episode was yesterday. His last BM was yesterday which was loose and brown. He otherwise denies hematochezia, melena, dyspnea, cough.  In the ED , patient was found to be in alcohol withdrawal, tachycardic up to 128, and hypertensive, no fever, and CIWA protocol initiated, CIWA score of 4 noted.      Prior EGD/Colonoscopy: No reports in system (Never had one)      PAST MEDICAL & SURGICAL HISTORY:  Chronic alcohol abuse  No significant past surgical history      FAMILY HISTORY:  No pertinent family history in first degree relatives      Social History:  Alcohol abuse    Home Medications: None    MEDICATIONS  (STANDING):  enoxaparin Injectable 40 milliGRAM(s) SubCutaneous daily  folic acid 1 milliGRAM(s) Oral daily  lactated ringers. 1000 milliLiter(s) (75 mL/Hr) IV Continuous <Continuous>  lactulose Syrup 15 Gram(s) Oral two times a day  LORazepam   Injectable 2 milliGRAM(s) IV Push every 4 hours  LORazepam   Injectable 1.5 milliGRAM(s) IV Push every 4 hours  LORazepam   Injectable   IV Push   multivitamin 1 Tablet(s) Oral daily  pantoprazole  Injectable 40 milliGRAM(s) IV Push two times a day  potassium chloride    Tablet ER 20 milliEquivalent(s) Oral every 2 hours  thiamine 100 milliGRAM(s) Oral daily  thiamine Injectable 100 milliGRAM(s) IntraMuscular once          Allergies  Allergy Status Unknown      Review of Systems:   Constitutional:  Chills+  ENT/Mouth:  No Hearing Changes,  No Difficulty Swallowing  Eyes:  No Eye Pain, No Vision Changes  Cardiovascular:  No Chest Pain, No Palpitations  Respiratory:  No Cough, No Dyspnea  Gastrointestinal:  As described in HPI  Musculoskeletal:  No Joint Swelling, No Back Pain  Skin: Jaundice+  Neuro:  No Syncope, No Dizziness  Heme/Lymph:  No Bruising          Physical Examination:  T(C): 36.6 (02-17-20 @ 08:00), Max: 37 (02-17-20 @ 04:41)  HR: 102 (02-17-20 @ 08:00) (91 - 128)  BP: 136/77 (02-17-20 @ 08:00) (136/77 - 160/88)  RR: 18 (02-17-20 @ 08:00) (18 - 18)  SpO2: 98% (02-17-20 @ 08:00) (98% - 100%)        Constitutional: Looks restless, disheveled   Eyes: Sclera is icteric.  Ears Nose and Throat: The external ears are normal appearing,  Oral mucosa is pink and moist.  Respiratory:  No signs of respiratory distress. Lung sounds are clear bilaterally.  Cardiovascular:  S1 S2, Regular rate and rhythm.  GI: Abdomen is soft, symmetric, and non-tender without distention. There are no visible lesions. Bowel sounds are present and normoactive in all four quadrants. No masses, hepatomegaly, or splenomegaly are noted.   THONG: brown stools  Neuro: Fine tremors noted on extending hand but no asterixis, AAO X 3  Skin: Jaundice.          Data: (reviewed by attending)                        11.3   3.98  )-----------( 64       ( 17 Feb 2020 07:32 )             34.3     Hgb Trend:  11.3  02-17-20 @ 07:32  12.1  02-16-20 @ 19:50      02-17    138  |  103  |  5<L>  ----------------------------<  114<H>  3.3<L>   |  20  |  0.6<L>    Ca    8.0<L>      17 Feb 2020 07:32  Phos  2.7     02-17  Mg     2.4     02-17    TPro  7.1  /  Alb  3.2<L>  /  TBili  4.6<H>  /  DBili  1.5<H>  /  AST  74<H>  /  ALT  25  /  AlkPhos  110  02-17    Liver panel trend:  TBili 4.6   /   AST 74   /   ALT 25   /   AlkP 110   /   Tptn 7.1   /   Alb 3.2    /   DBili 1.5      02-17  TBili 4.7   /   AST 79   /   ALT 29   /   AlkP 132   /   Tptn 8.1   /   Alb 3.6    /   DBili --      02-16      PT/INR - ( 17 Feb 2020 07:32 )   PT: 19.90 sec;   INR: 1.73 ratio         PTT - ( 17 Feb 2020 07:32 )  PTT:49.1 sec        Radiology:(reviewed by attending)  CT Abdomen and Pelvis w/ IV Cont:   EXAM:  CT ABDOMEN AND PELVIS IC            PROCEDURE DATE:  02/16/2020            INTERPRETATION:  CLINICAL STATEMENT: Epigastric abdominal pain. Alcohol abuse.    TECHNIQUE: Contiguous axial CT images were obtained from the lower chest to the pubic symphysis following administration of 100cc Optiray 320 intravenous contrast.  Oral contrast was not administered.  Reformatted images in the coronal and sagittal planes were acquired.    COMPARISON CT: None.    OTHER STUDIES USED FOR CORRELATION: None.       FINDINGS:    LOWER CHEST: Unremarkable.    HEPATOBILIARY: Diffusely fatty infiltrated liver with nodular surface contour suggestive of cirrhosis.    SPLEEN: Unremarkable.    PANCREAS: Unremarkable.    ADRENAL GLANDS: Unremarkable.    KIDNEYS: Unremarkable.    ABDOMINOPELVIC NODES: Unremarkable.    PELVIC ORGANS: Unremarkable.    PERITONEUM/MESENTERY/BOWEL: Unremarkable.    BONES/SOFT TISSUES: Mild osteophytosis in the lower thoracic spine.    OTHER: Small fat-containing umbilical hernia      IMPRESSION:   1.  No CT evidence of acute abdominopelvic pathology.  2.  Severe fatty infiltrated nodular cirrhotic liver.                  ANNEMARIE GODOY M.D., ATTENDING RADIOLOGIST  This document has been electronically signed. Feb 16 2020  9:20PM             (02-16-20 @ 20:19)    US Abdomen Limited:   EXAM:  US ABDOMEN LIMITED            PROCEDURE DATE:  02/16/2020            INTERPRETATION:  CLINICAL HISTORY: Right upper quadrant pain.    COMPARISON: CT dated 2/16/2020    PROCEDURE: Ultrasound of the right upper quadrant was performed.    FINDINGS:    LIVER: Nodular contour of the liver without definite hepatic mass.. No focal mass.    GALLBLADDER/BILIARY TREE: The gallbladder is partially obscured without definite cholelithiasis or gallbladder wall thickening. There is a negative sonographic Rizvi's sign. The common bile duct measures 5 mm, which is normal.     PANCREAS: Obscured by overlying bowel gas.    KIDNEY:  Right kidney measures 11.5 cm in length. No hydronephrosis, calculi or solid mass.    AORTA/IVC:  Visualized proximal portions unremarkable.    ASCITES:  None.    IMPRESSION:    Nodular contour of the liver raising a question of cirrhosis.    Suboptimally imaged gallbladder without definite cholelithiasis or wall thickening.                  MOLLY JHA M.D., ATTENDING RADIOLOGIST  This document has been electronically signed. Feb 16 2020 10:45PM             (02-16-20 @ 22:41)

## 2020-02-17 NOTE — H&P ADULT - HISTORY OF PRESENT ILLNESS
39 year old male patient, chronic alcohol abuse, homeless, no other past medical history presenting for epigastric pain.   The patient has been drinking a 12 pack beer daily as well as hard liquor (3 cups of Vodka daily)   He states that he has been experiencing epigastric pain for a week, radiating to the left side of the back, very severe, patient unsure of the quality of the pain, accompanied with subjective fever as well as diarrhea, bilious emesis and nausea. He otherwise denies hematemesis, hematochezia, melena, dyspnea, cough. He reports a chest pressure sensation that has been present for a month and that is exacerbated by PO intake.     In the ED , patient was found to be in alcohol withdrawal, tachycardic up to 128, and hypertensive, no fever, and CIWA protocol initiated, CIWA score of 4 noted, patient was started on Ativan taper, Thiamine, Folic acid and multivitamins

## 2020-02-17 NOTE — H&P ADULT - ASSESSMENT
39 year old male patient, chronic alcohol abuse, homeless presenting for epigastric pain.    # Acute alcohol withdrawal   - CIWA protocol with Ativan taper   - Thiamine 100 mg daily, Folic acid 1 mg daily, multivitamin   - F/U urine tox screen   - Detox consult   - Monitor vital signs q4h for 24 h  - Aspiration and fall precautions. Maintain on bedrest for now   - IVF supplementation with LR @ 75 mL/h     # Hypomagnesemia   - 2g IV MgSO4 ordered   - F/U AM Mg level     # Findings of cirrhosis with nodular contour of the liver on US   - Most likely related to chronic alcohol abuse but remaining etiologies should be ruled out as well  - Will order [facility] workup : MARCUS, AMA, ASMA, Liver kidney Ag, hepatitis panel, ceruloplasmin, alpha 1 antitrypsin, iron studies with ferritin, Ig levels   - F/U coagulation studies , thrombocytopenia noted, no hypoalbuminemia   - Outpatient follow up at liver clinic     # Miscellaneous   - DVT prophylaxis : Lovenox   - GI prophylaxis : Protonix 40 mg daily   - Diet : DASH TLC   - Activity : Bedrest  - Full code

## 2020-02-18 LAB
A1AT SERPL-MCNC: 121 MG/DL — SIGNIFICANT CHANGE UP (ref 90–200)
ALBUMIN SERPL ELPH-MCNC: 3.1 G/DL — LOW (ref 3.5–5.2)
ALBUMIN SERPL ELPH-MCNC: 3.2 G/DL — LOW (ref 3.5–5.2)
ALP SERPL-CCNC: 124 U/L — HIGH (ref 30–115)
ALP SERPL-CCNC: 125 U/L — HIGH (ref 30–115)
ALT FLD-CCNC: 27 U/L — SIGNIFICANT CHANGE UP (ref 0–41)
ALT FLD-CCNC: 28 U/L — SIGNIFICANT CHANGE UP (ref 0–41)
ANA TITR SER: NEGATIVE — SIGNIFICANT CHANGE UP
ANION GAP SERPL CALC-SCNC: 14 MMOL/L — SIGNIFICANT CHANGE UP (ref 7–14)
AST SERPL-CCNC: 78 U/L — HIGH (ref 0–41)
AST SERPL-CCNC: 78 U/L — HIGH (ref 0–41)
BILIRUB DIRECT SERPL-MCNC: 1.1 MG/DL — HIGH (ref 0–0.2)
BILIRUB INDIRECT FLD-MCNC: 1.8 MG/DL — HIGH (ref 0.2–1.2)
BILIRUB SERPL-MCNC: 2.9 MG/DL — HIGH (ref 0.2–1.2)
BILIRUB SERPL-MCNC: 2.9 MG/DL — HIGH (ref 0.2–1.2)
BUN SERPL-MCNC: <3 MG/DL — LOW (ref 10–20)
CALCIUM SERPL-MCNC: 8.4 MG/DL — LOW (ref 8.5–10.1)
CERULOPLASMIN SERPL-MCNC: 15 MG/DL — SIGNIFICANT CHANGE UP (ref 15–30)
CHLORIDE SERPL-SCNC: 106 MMOL/L — SIGNIFICANT CHANGE UP (ref 98–110)
CO2 SERPL-SCNC: 20 MMOL/L — SIGNIFICANT CHANGE UP (ref 17–32)
CREAT SERPL-MCNC: 0.6 MG/DL — LOW (ref 0.7–1.5)
DIR ANTIGLOB POLYSPECIFIC INTERPRETATION: SIGNIFICANT CHANGE UP
FERRITIN SERPL-MCNC: 36 NG/ML — SIGNIFICANT CHANGE UP (ref 30–400)
GLUCOSE SERPL-MCNC: 116 MG/DL — HIGH (ref 70–99)
HAV IGG SER QL IA: REACTIVE
HBV CORE AB SER-ACNC: SIGNIFICANT CHANGE UP
HBV CORE IGM SER-ACNC: SIGNIFICANT CHANGE UP
HBV E AB SER-ACNC: NEGATIVE — SIGNIFICANT CHANGE UP
HBV E AG SER-ACNC: NEGATIVE — SIGNIFICANT CHANGE UP
HBV SURFACE AB SER-ACNC: SIGNIFICANT CHANGE UP
HBV SURFACE AG SER-ACNC: SIGNIFICANT CHANGE UP
HCT VFR BLD CALC: 34.7 % — LOW (ref 42–52)
HCV AB S/CO SERPL IA: 0.22 S/CO — SIGNIFICANT CHANGE UP (ref 0–0.99)
HCV AB SERPL-IMP: SIGNIFICANT CHANGE UP
HGB BLD-MCNC: 11.2 G/DL — LOW (ref 14–18)
IGA FLD-MCNC: 933 MG/DL — HIGH (ref 84–499)
IGG FLD-MCNC: 1912 MG/DL — HIGH (ref 610–1660)
IGM SERPL-MCNC: 114 MG/DL — SIGNIFICANT CHANGE UP (ref 35–242)
INR BLD: 1.63 RATIO — HIGH (ref 0.65–1.3)
KAPPA LC SER QL IFE: 5.42 MG/DL — HIGH (ref 0.33–1.94)
KAPPA/LAMBDA FREE LIGHT CHAIN RATIO, SERUM: 0.94 RATIO — SIGNIFICANT CHANGE UP (ref 0.26–1.65)
LAMBDA LC SER QL IFE: 5.76 MG/DL — HIGH (ref 0.57–2.63)
LDH SERPL L TO P-CCNC: 243 U/L — HIGH (ref 50–242)
MCHC RBC-ENTMCNC: 23.7 PG — LOW (ref 27–31)
MCHC RBC-ENTMCNC: 32.3 G/DL — SIGNIFICANT CHANGE UP (ref 32–37)
MCV RBC AUTO: 73.5 FL — LOW (ref 80–94)
MITOCHONDRIA AB SER-ACNC: SIGNIFICANT CHANGE UP
NRBC # BLD: 0 /100 WBCS — SIGNIFICANT CHANGE UP (ref 0–0)
PLATELET # BLD AUTO: 73 K/UL — LOW (ref 130–400)
POTASSIUM SERPL-MCNC: 3.6 MMOL/L — SIGNIFICANT CHANGE UP (ref 3.5–5)
POTASSIUM SERPL-SCNC: 3.6 MMOL/L — SIGNIFICANT CHANGE UP (ref 3.5–5)
PROT SERPL-MCNC: 7.2 G/DL — SIGNIFICANT CHANGE UP (ref 6–8)
PROT SERPL-MCNC: 7.3 G/DL — SIGNIFICANT CHANGE UP (ref 6–8)
PROTHROM AB SERPL-ACNC: 18.8 SEC — HIGH (ref 9.95–12.87)
RBC # BLD: 4.67 M/UL — LOW (ref 4.7–6.1)
RBC # BLD: 4.72 M/UL — SIGNIFICANT CHANGE UP (ref 4.7–6.1)
RBC # FLD: 21.1 % — HIGH (ref 11.5–14.5)
RETICS #: 103.2 K/UL — SIGNIFICANT CHANGE UP (ref 25–125)
RETICS/RBC NFR: 2.2 % — HIGH (ref 0.5–1.5)
SMOOTH MUSCLE AB SER-ACNC: ABNORMAL
SODIUM SERPL-SCNC: 140 MMOL/L — SIGNIFICANT CHANGE UP (ref 135–146)
TRIGL SERPL-MCNC: 76 MG/DL — SIGNIFICANT CHANGE UP (ref 10–149)
WBC # BLD: 4.11 K/UL — LOW (ref 4.8–10.8)
WBC # FLD AUTO: 4.11 K/UL — LOW (ref 4.8–10.8)

## 2020-02-18 PROCEDURE — 99233 SBSQ HOSP IP/OBS HIGH 50: CPT

## 2020-02-18 PROCEDURE — 71045 X-RAY EXAM CHEST 1 VIEW: CPT | Mod: 26

## 2020-02-18 RX ORDER — PREDNISOLONE 5 MG
40 TABLET ORAL DAILY
Refills: 0 | Status: DISCONTINUED | OUTPATIENT
Start: 2020-02-18 | End: 2020-02-18

## 2020-02-18 RX ORDER — INFLUENZA VIRUS VACCINE 15; 15; 15; 15 UG/.5ML; UG/.5ML; UG/.5ML; UG/.5ML
0.5 SUSPENSION INTRAMUSCULAR ONCE
Refills: 0 | Status: DISCONTINUED | OUTPATIENT
Start: 2020-02-18 | End: 2020-02-25

## 2020-02-18 RX ADMIN — Medication 100 MILLIGRAM(S): at 12:11

## 2020-02-18 RX ADMIN — Medication 40 MILLIGRAM(S): at 12:14

## 2020-02-18 RX ADMIN — Medication 1.5 MILLIGRAM(S): at 06:06

## 2020-02-18 RX ADMIN — Medication 1.5 MILLIGRAM(S): at 18:15

## 2020-02-18 RX ADMIN — Medication 40 MILLIGRAM(S): at 06:06

## 2020-02-18 RX ADMIN — Medication 1.5 MILLIGRAM(S): at 13:08

## 2020-02-18 RX ADMIN — Medication 1 MILLIGRAM(S): at 22:05

## 2020-02-18 RX ADMIN — Medication 1.5 MILLIGRAM(S): at 03:05

## 2020-02-18 RX ADMIN — Medication 1 MILLIGRAM(S): at 12:11

## 2020-02-18 RX ADMIN — SODIUM CHLORIDE 75 MILLILITER(S): 9 INJECTION, SOLUTION INTRAVENOUS at 20:41

## 2020-02-18 RX ADMIN — Medication 1 TABLET(S): at 12:11

## 2020-02-18 RX ADMIN — LACTULOSE 15 GRAM(S): 10 SOLUTION ORAL at 06:05

## 2020-02-18 RX ADMIN — PANTOPRAZOLE SODIUM 40 MILLIGRAM(S): 20 TABLET, DELAYED RELEASE ORAL at 06:05

## 2020-02-18 RX ADMIN — PANTOPRAZOLE SODIUM 40 MILLIGRAM(S): 20 TABLET, DELAYED RELEASE ORAL at 18:15

## 2020-02-18 NOTE — PROGRESS NOTE ADULT - SUBJECTIVE AND OBJECTIVE BOX
Gastroenterology progress note:     Patient is a 39y old  Male who presents with a chief complaint of Alcohol abuse (18 Feb 2020 10:35)       Admitted on: 02-17-20    We are following the patient for alcoholic liver disease     Interval History: Patient had one BM today which was brown, tolerating DASH diet. Abdominal pain is significantly better and no vomiting now.      PAST MEDICAL & SURGICAL HISTORY:  Chronic alcohol abuse  No significant past surgical history        Allergies  Allergy Status Unknown        FH: No significant FH    Social history: Alcohol abuse+    MEDICATIONS  (STANDING):  enoxaparin Injectable 40 milliGRAM(s) SubCutaneous daily  folic acid 1 milliGRAM(s) Oral daily  influenza   Vaccine 0.5 milliLiter(s) IntraMuscular once  lactated ringers. 1000 milliLiter(s) (75 mL/Hr) IV Continuous <Continuous>  lactulose Syrup 15 Gram(s) Oral two times a day  LORazepam   Injectable 1.5 milliGRAM(s) IV Push every 4 hours  LORazepam   Injectable 1 milliGRAM(s) IV Push every 4 hours  LORazepam   Injectable   IV Push   multivitamin 1 Tablet(s) Oral daily  pantoprazole  Injectable 40 milliGRAM(s) IV Push two times a day  predniSONE   Tablet 40 milliGRAM(s) Oral daily  thiamine 100 milliGRAM(s) Oral daily  thiamine Injectable 100 milliGRAM(s) IntraMuscular once    MEDICATIONS  (PRN):        Review of Systems:   Constitutional: No acute distress  Eyes: jaundice+  Skin: No rash or bruises  Cardiovascular:  No Chest Pain, No Palpitations  Respiratory:  No Cough, No Dyspnea  Gastrointestinal:  As described in HPI  Extremities: No leg swelling or pain  Neuro: No tremors or seizures      Physical Examination:  T(C): 36.8 (02-18-20 @ 07:54), Max: 36.8 (02-18-20 @ 07:54)  HR: 90 (02-18-20 @ 07:54) (88 - 126)  BP: 146/87 (02-18-20 @ 07:54) (132/71 - 158/95)  RR: 20 (02-18-20 @ 07:54) (18 - 20)  SpO2: 98% (02-18-20 @ 07:54) (98% - 98%)      Constitutional: Lying in bed, no acute distress  Neck: No thyromegaly or mass noted.  Respiratory:  No signs of respiratory distress. Lung sounds are clear bilaterally.  Cardiovascular:  S1 S2, Regular rate and rhythm.  Abdominal: Abdomen is soft, symmetric, and non-tender without distention. There are no visible lesions. Bowel sounds are present and normoactive in all four quadrants. No masses, hepatomegaly, or splenomegaly are noted.   Extremities: No pedal edema or cyanosis  Neuro: AAO X 3, no tremors          Data: (reviewed by attending)                        11.2   4.11  )-----------( 73       ( 18 Feb 2020 07:20 )             34.7     Hgb trend:  11.2  02-18-20 @ 07:20  11.3  02-17-20 @ 07:32  12.1  02-16-20 @ 19:50      02-18    140  |  106  |  <3<L>  ----------------------------<  116<H>  3.6   |  20  |  0.6<L>    Ca    8.4<L>      18 Feb 2020 07:20  Phos  2.7     02-17  Mg     2.4     02-17    TPro  7.3  /  Alb  3.2<L>  /  TBili  2.9<H>  /  DBili  1.1<H>  /  AST  78<H>  /  ALT  27  /  AlkPhos  124<H>  02-18    Liver panel trend:  TBili 2.9   /   AST 78   /   ALT 27   /   AlkP 124   /   Tptn 7.3   /   Alb 3.2    /   DBili 1.1      02-18  TBili 4.6   /   AST 74   /   ALT 25   /   AlkP 110   /   Tptn 7.1   /   Alb 3.2    /   DBili 1.5      02-17  TBili 4.7   /   AST 79   /   ALT 29   /   AlkP 132   /   Tptn 8.1   /   Alb 3.6    /   DBili --      02-16      PT/INR - ( 18 Feb 2020 07:20 )   PT: 18.80 sec;   INR: 1.63 ratio         PTT - ( 17 Feb 2020 07:32 )  PTT:49.1 sec       Radiology: (reviewed by attending)    US Abdomen Limited:   EXAM:  US ABDOMEN LIMITED            PROCEDURE DATE:  02/16/2020            INTERPRETATION:  CLINICAL HISTORY: Right upper quadrant pain.    COMPARISON: CT dated 2/16/2020    PROCEDURE: Ultrasound of the right upper quadrant was performed.    FINDINGS:    LIVER: Nodular contour of the liver without definite hepatic mass.. No focal mass.    GALLBLADDER/BILIARY TREE: The gallbladder is partially obscured without definite cholelithiasis or gallbladder wall thickening. There is a negative sonographic Rizvi's sign. The common bile duct measures 5 mm, which is normal.     PANCREAS: Obscured by overlying bowel gas.    KIDNEY:  Right kidney measures 11.5 cm in length. No hydronephrosis, calculi or solid mass.    AORTA/IVC:  Visualized proximal portions unremarkable.    ASCITES:  None.    IMPRESSION:    Nodular contour of the liver raising a question of cirrhosis.    Suboptimally imaged gallbladder without definite cholelithiasis or wall thickening.                  MOLLY JHA M.D., ATTENDING RADIOLOGIST  This document has been electronically signed. Feb 16 2020 10:45PM             (02-16-20 @ 22:41)

## 2020-02-18 NOTE — PROGRESS NOTE ADULT - SUBJECTIVE AND OBJECTIVE BOX
NAZARIO WASHINGTON 39y Male  MRN#: [id number]     SUBJECTIVE  Patient is a 39y old Male who presents with a chief complaint of Alcohol abuse (2020 15:22)  Currently admitted to medicine with the primary diagnosis of Alcohol withdrawal    OBJECTIVE  PAST MEDICAL & SURGICAL HISTORY  Chronic alcohol abuse  No significant past surgical history    ALLERGIES:  Allergy Status Unknown    MEDICATIONS:  STANDING MEDICATIONS  enoxaparin Injectable 40 milliGRAM(s) SubCutaneous daily  folic acid 1 milliGRAM(s) Oral daily  influenza   Vaccine 0.5 milliLiter(s) IntraMuscular once  lactated ringers. 1000 milliLiter(s) IV Continuous <Continuous>  lactulose Syrup 15 Gram(s) Oral two times a day  LORazepam   Injectable 1.5 milliGRAM(s) IV Push every 4 hours  LORazepam   Injectable 1 milliGRAM(s) IV Push every 4 hours  LORazepam   Injectable   IV Push   multivitamin 1 Tablet(s) Oral daily  pantoprazole  Injectable 40 milliGRAM(s) IV Push two times a day  predniSONE   Tablet 40 milliGRAM(s) Oral daily  thiamine 100 milliGRAM(s) Oral daily  thiamine Injectable 100 milliGRAM(s) IntraMuscular once    PRN MEDICATIONS      VITAL SIGNS: Last 24 Hours  T(C): 36.8 (2020 07:54), Max: 36.8 (2020 07:54)  T(F): 98.2 (2020 07:54), Max: 98.2 (2020 07:54)  HR: 90 (2020 07:54) (88 - 126)  BP: 146/87 (2020 07:54) (132/71 - 158/95)  BP(mean): --  RR: 20 (2020 07:54) (18 - 20)  SpO2: 98% (2020 07:54) (98% - 98%)    LABS:                        11.2   4.11  )-----------( 73       ( 2020 07:20 )             34.7     02-18    140  |  106  |  <3<L>  ----------------------------<  116<H>  3.6   |  20  |  0.6<L>    Ca    8.4<L>      2020 07:20  Phos  2.7     02-17  Mg     2.4         TPro  7.3  /  Alb  3.2<L>  /  TBili  2.9<H>  /  DBili  1.1<H>  /  AST  78<H>  /  ALT  27  /  AlkPhos  124<H>      PT/INR - ( 2020 07:20 )   PT: 18.80 sec;   INR: 1.63 ratio         PTT - ( 2020 07:32 )  PTT:49.1 sec  Urinalysis Basic - ( 2020 22:45 )    Color: Yellow / Appearance: Clear / S.037 / pH: x  Gluc: x / Ketone: Small  / Bili: Negative / Urobili: <2 mg/dL   Blood: x / Protein: Negative / Nitrite: Negative   Leuk Esterase: Negative / RBC: x / WBC x   Sq Epi: x / Non Sq Epi: x / Bacteria: x    PHYSICAL EXAM:  General : Patient is alert, no acute distress, no agitation noted   	Eyes / Neuro :No tremor, no diaphoresis, no nystagmus, PERLLA, EOMI, + Jaundice    	Lungs : Clear to auscultation bilaterally, unlabored breathing, on room air   	Cardiovascular : Regular S1S2  	Abdomen : Soft with mild epigastric tenderness, + Hepatosplenomegaly, no sequelae of chronic liver disease, no guarding   Extremities : No edema, no cyanosis

## 2020-02-18 NOTE — PROGRESS NOTE ADULT - ASSESSMENT
39 year old male patient, chronic alcohol abuse, homeless presenting for epigastric pain.    # Acute alcohol withdrawal   - CIWA protocol with Ativan taper   - Thiamine 100 mg daily, Folic acid 1 mg daily, multivitamin   - F/U urine tox screen   - Detox consult   - Monitor vital signs q4h for 24 h  - Aspiration and fall precautions. Maintain on bedrest for now   - IVF supplementation with LR @ 75 mL/h     # Hypomagnesemia   - 2g IV MgSO4 ordered   - F/U AM Mg level     # Findings of cirrhosis with nodular contour of the liver on US   - Most likely related to chronic alcohol abuse but remaining etiologies should be ruled out as well  - Will order CLD workup : MARCUS, AMA, ASMA, Liver kidney Ag, hepatitis panel, ceruloplasmin, alpha 1 antitrypsin, iron studies with ferritin, Ig levels   - F/U coagulation studies , thrombocytopenia noted, no hypoalbuminemia   - Outpatient follow up at liver clinic     # Miscellaneous   - DVT prophylaxis : Lovenox   - GI prophylaxis : Protonix 40 mg daily   - Diet : DASH TLC   - Activity : Bedrest  - Full code 39 year old male patient, chronic alcohol abuse, homeless presenting for epigastric pain.    # Acute alcohol withdrawal   - CIWA 0 THIS MORNING  -Getting q4 Hour ativan  -Will continue it for today according to addiction medicine as  because of very high risk of withdrawl,  due to prolonged and severe alcohol use.  -On folic acid, Multivitamin  - Detox consult following up  - Monitor vital signs q4h for 24 h, according to nurses  only q8 can be done  - Aspiration and fall precautions.  - IVF supplementation with LR @ 75 mL/h     #)New Liver Cirrhosis  -New diagnosis  - Most likely related to chronic alcohol abuse   - Will follow up with the CLD workup  - F/U coagulation studies , thrombocytopenia noted, no hypoalbuminemia   - Outpatient follow up at liver clinic   -Needs Inpatient EGD for epigastric pain Inpatient once the alcohol  withdrawal is completely resolved.  -GI on board  -Daily LFTs, INR    # Miscellaneous   - DVT prophylaxis : Lovenox   - GI prophylaxis : Protonix 40 mg BID  - Diet : DASH TLC   - Activity : OOB to chair  - Full code

## 2020-02-18 NOTE — PROGRESS NOTE ADULT - ASSESSMENT
39 year old male patient, homeless, active alcohol abuse, no other known past medical history presenting for epigastric pain and vomiting for the last 2 days.    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  Please trend liver enzymes, CMP, INR daily.  Monitor electrolytes, mental status.  c/w Prednisolone 40mg daily for alcoholic hepatitis (Day #2)  f/u Blood cx, CXR (UA is neg)    Alcohol abuse:  Ativan protocol for alcohol withdrawal.  RL at 75cc/hr  Folate, MVI and thiamine  Alcohol cessation  c/w DASH diet.    # Vomiting with blood tinge : Likely Aziza Chau vs PUD vs erosive esophagitis vs erosive gastritis/duodenitis   THONG: brown stools  Hemodynamically stable  No active bleeding  Hg stable    Rec:  Needs EGD after medical stabilization before discharge  c/w protonix 40mg BID  Trend CBC daily  can c/w DASH diet      # Esophageal varices  needs screening EGD    #No ascites on US abdomen  No Known H/O HE    Coagulopathy:  Monitor INR daily.    HRS: No H/O HRS and Cr normal    # HCC screening:  f/u AFP now  CT abdomen with con: No lesions in liver for HCC    # Lifestyle modifications  Calorie intake 25-40 Kcal/kg/day  Protein intake: 1.2-1.5 gm/kg/day  Avoid smoking, alcohol, NSAIDS.    #Vaccinations:  Please f/u in clinic for being uptodate with HAV/HBV/Influenza/Pneumococcal vaccines.

## 2020-02-19 LAB
AFP-TM SERPL-MCNC: 5.5 NG/ML — SIGNIFICANT CHANGE UP
ALBUMIN SERPL ELPH-MCNC: 3.4 G/DL — LOW (ref 3.5–5.2)
ALP SERPL-CCNC: 110 U/L — SIGNIFICANT CHANGE UP (ref 30–115)
ALT FLD-CCNC: 26 U/L — SIGNIFICANT CHANGE UP (ref 0–41)
ANION GAP SERPL CALC-SCNC: 13 MMOL/L — SIGNIFICANT CHANGE UP (ref 7–14)
ANION GAP SERPL CALC-SCNC: 14 MMOL/L — SIGNIFICANT CHANGE UP (ref 7–14)
AST SERPL-CCNC: 64 U/L — HIGH (ref 0–41)
BILIRUB SERPL-MCNC: 2.7 MG/DL — HIGH (ref 0.2–1.2)
BUN SERPL-MCNC: 6 MG/DL — LOW (ref 10–20)
BUN SERPL-MCNC: 6 MG/DL — LOW (ref 10–20)
CALCIUM SERPL-MCNC: 8.5 MG/DL — SIGNIFICANT CHANGE UP (ref 8.5–10.1)
CALCIUM SERPL-MCNC: 8.5 MG/DL — SIGNIFICANT CHANGE UP (ref 8.5–10.1)
CHLORIDE SERPL-SCNC: 104 MMOL/L — SIGNIFICANT CHANGE UP (ref 98–110)
CHLORIDE SERPL-SCNC: 105 MMOL/L — SIGNIFICANT CHANGE UP (ref 98–110)
CO2 SERPL-SCNC: 22 MMOL/L — SIGNIFICANT CHANGE UP (ref 17–32)
CO2 SERPL-SCNC: 22 MMOL/L — SIGNIFICANT CHANGE UP (ref 17–32)
CREAT SERPL-MCNC: 0.6 MG/DL — LOW (ref 0.7–1.5)
CREAT SERPL-MCNC: 0.7 MG/DL — SIGNIFICANT CHANGE UP (ref 0.7–1.5)
GLUCOSE SERPL-MCNC: 114 MG/DL — HIGH (ref 70–99)
GLUCOSE SERPL-MCNC: 115 MG/DL — HIGH (ref 70–99)
HAPTOGLOB SERPL-MCNC: <20 MG/DL — LOW (ref 34–200)
HCT VFR BLD CALC: 35.6 % — LOW (ref 42–52)
HEV AB FLD QL: POSITIVE
HGB BLD-MCNC: 11.5 G/DL — LOW (ref 14–18)
INR BLD: 1.74 RATIO — HIGH (ref 0.65–1.3)
LKM AB SER-ACNC: <20.1 UNITS — SIGNIFICANT CHANGE UP (ref 0–20)
MCHC RBC-ENTMCNC: 24.3 PG — LOW (ref 27–31)
MCHC RBC-ENTMCNC: 32.3 G/DL — SIGNIFICANT CHANGE UP (ref 32–37)
MCV RBC AUTO: 75.3 FL — LOW (ref 80–94)
NRBC # BLD: 0 /100 WBCS — SIGNIFICANT CHANGE UP (ref 0–0)
PLATELET # BLD AUTO: 78 K/UL — LOW (ref 130–400)
POTASSIUM SERPL-MCNC: 3.3 MMOL/L — LOW (ref 3.5–5)
POTASSIUM SERPL-MCNC: 3.8 MMOL/L — SIGNIFICANT CHANGE UP (ref 3.5–5)
POTASSIUM SERPL-SCNC: 3.3 MMOL/L — LOW (ref 3.5–5)
POTASSIUM SERPL-SCNC: 3.8 MMOL/L — SIGNIFICANT CHANGE UP (ref 3.5–5)
PROT SERPL-MCNC: 7.2 G/DL — SIGNIFICANT CHANGE UP (ref 6–8)
PROTHROM AB SERPL-ACNC: 20 SEC — HIGH (ref 9.95–12.87)
RBC # BLD: 4.73 M/UL — SIGNIFICANT CHANGE UP (ref 4.7–6.1)
RBC # FLD: 21.5 % — HIGH (ref 11.5–14.5)
SODIUM SERPL-SCNC: 139 MMOL/L — SIGNIFICANT CHANGE UP (ref 135–146)
SODIUM SERPL-SCNC: 141 MMOL/L — SIGNIFICANT CHANGE UP (ref 135–146)
WBC # BLD: 5.63 K/UL — SIGNIFICANT CHANGE UP (ref 4.8–10.8)
WBC # FLD AUTO: 5.63 K/UL — SIGNIFICANT CHANGE UP (ref 4.8–10.8)

## 2020-02-19 PROCEDURE — 70450 CT HEAD/BRAIN W/O DYE: CPT | Mod: 26

## 2020-02-19 PROCEDURE — 99233 SBSQ HOSP IP/OBS HIGH 50: CPT

## 2020-02-19 RX ORDER — PHYTONADIONE (VIT K1) 5 MG
10 TABLET ORAL ONCE
Refills: 0 | Status: COMPLETED | OUTPATIENT
Start: 2020-02-19 | End: 2020-02-19

## 2020-02-19 RX ORDER — CHLORHEXIDINE GLUCONATE 213 G/1000ML
1 SOLUTION TOPICAL
Refills: 0 | Status: DISCONTINUED | OUTPATIENT
Start: 2020-02-19 | End: 2020-02-25

## 2020-02-19 RX ORDER — POTASSIUM CHLORIDE 20 MEQ
20 PACKET (EA) ORAL ONCE
Refills: 0 | Status: COMPLETED | OUTPATIENT
Start: 2020-02-19 | End: 2020-02-19

## 2020-02-19 RX ADMIN — LACTULOSE 15 GRAM(S): 10 SOLUTION ORAL at 18:34

## 2020-02-19 RX ADMIN — Medication 1 TABLET(S): at 13:26

## 2020-02-19 RX ADMIN — SODIUM CHLORIDE 75 MILLILITER(S): 9 INJECTION, SOLUTION INTRAVENOUS at 06:59

## 2020-02-19 RX ADMIN — Medication 1 MILLIGRAM(S): at 05:12

## 2020-02-19 RX ADMIN — PANTOPRAZOLE SODIUM 40 MILLIGRAM(S): 20 TABLET, DELAYED RELEASE ORAL at 18:34

## 2020-02-19 RX ADMIN — ENOXAPARIN SODIUM 40 MILLIGRAM(S): 100 INJECTION SUBCUTANEOUS at 13:26

## 2020-02-19 RX ADMIN — Medication 50 MILLIEQUIVALENT(S): at 18:34

## 2020-02-19 RX ADMIN — PANTOPRAZOLE SODIUM 40 MILLIGRAM(S): 20 TABLET, DELAYED RELEASE ORAL at 05:12

## 2020-02-19 RX ADMIN — Medication 100 MILLIGRAM(S): at 13:26

## 2020-02-19 RX ADMIN — Medication 1 MILLIGRAM(S): at 02:17

## 2020-02-19 RX ADMIN — Medication 1 MILLIGRAM(S): at 13:26

## 2020-02-19 RX ADMIN — Medication 1 MILLIGRAM(S): at 20:18

## 2020-02-19 RX ADMIN — Medication 10 MILLIGRAM(S): at 16:13

## 2020-02-19 RX ADMIN — LACTULOSE 15 GRAM(S): 10 SOLUTION ORAL at 05:12

## 2020-02-19 RX ADMIN — Medication 0.5 MILLIGRAM(S): at 22:03

## 2020-02-19 RX ADMIN — Medication 1 MILLIGRAM(S): at 13:27

## 2020-02-19 RX ADMIN — Medication 40 MILLIGRAM(S): at 05:12

## 2020-02-19 RX ADMIN — Medication 1 MILLIGRAM(S): at 17:49

## 2020-02-19 RX ADMIN — SODIUM CHLORIDE 75 MILLILITER(S): 9 INJECTION, SOLUTION INTRAVENOUS at 22:04

## 2020-02-19 RX ADMIN — Medication 1 MILLIGRAM(S): at 16:03

## 2020-02-19 NOTE — PROGRESS NOTE ADULT - ASSESSMENT
39 year old male patient, homeless, active alcohol abuse, no other known past medical history presenting for epigastric pain and vomiting for the last 2 days.    # Abdominal pain/diarrhea/vomiting/elevated LFTS: Likely acute alcoholic hepatitis: improving significantly  Alcoholic liver cirrhosis (alcohol, thrombocytopenia, elevated LFTS, AST/ALT ratio reversal, US abdomen showed nodular liver)  Mental status intact, Coagulopathy +  MELD score 16  Lilie's score 0.016 likely patient has good prognosis  CLD work up: HBC< HCV neg, H/O HAV infection, ceruloplasmin neg, iron studies neg for iron overload (ferritin normal), MARCUS, AMA, SMA, Anti HEV neg  Gamma Globulin slightly elevated    Rec:  Please trend liver enzymes, CMP, INR daily.  Monitor electrolytes, mental status.  c/w Prednisolone 40mg daily for alcoholic hepatitis (Day #3)    Alcohol abuse:  Ativan protocol for alcohol withdrawal.  RL at 75cc/hr  Folate, MVI and thiamine  Alcohol cessation  c/w DASH diet.    # Vomiting with blood tinge : Likely Aziza Chau vs PUD vs erosive esophagitis vs erosive gastritis/duodenitis   Hemodynamically stable  No active bleeding  Hg stable    Rec:  c/w protonix 40mg BID  Trend CBC daily  Give Vit K 10mg oral once if INR < 1.5 will perform EGD tomorrow  NPO after midnight      # Esophageal varices  needs screening EGD    #No ascites on US abdomen  No Known H/O HE    Coagulopathy:  Monitor INR daily.    HRS: No H/O HRS and Cr normal    # HCC screening:  f/u AFP now  CT abdomen with con: No lesions in liver for HCC 39 year old male patient, homeless, active alcohol abuse, no other known past medical history presenting for epigastric pain and vomiting for the last 2 days.    # Abdominal pain/diarrhea/vomiting/elevated LFTS: Likely acute alcoholic hepatitis: improving significantly  Alcoholic liver cirrhosis (alcohol, thrombocytopenia, elevated LFTS, AST/ALT ratio reversal, US abdomen showed nodular liver)  Mental status intact, Coagulopathy +  MELD score 16  Lilie's score 0.016 likely patient has good prognosis  CLD work up: HBC< HCV neg, H/O HAV infection, ceruloplasmin neg, iron studies neg for iron overload (ferritin normal), MARCUS, AMA, SMA, Anti HEV neg  Gamma Globulin slightly elevated    Rec:  Please trend liver enzymes, CMP, INR daily.  Monitor electrolytes, mental status.  c/w Prednisolone 40mg daily for alcoholic hepatitis (Day #3)    Alcohol abuse:  Ativan protocol for alcohol withdrawal.  RL at 75cc/hr  Folate, MVI and thiamine  Alcohol cessation  c/w DASH diet.    # Vomiting with blood tinge : Likely Aziza Chau vs PUD vs erosive esophagitis vs erosive gastritis/duodenitis   Hemodynamically stable  No active bleeding  Hg stable    Rec:  c/w protonix 40mg BID  Trend CBC daily  Give Vit K 10mg daily for 3 doses.   if INR < 1.5 will perform EGD tomorrow  NPO after midnight      # Esophageal varices  needs screening EGD    #No ascites on US abdomen  No Known H/O HE    Coagulopathy:  Monitor INR daily.    HRS: No H/O HRS and Cr normal    # HCC screening:  f/u AFP now  CT abdomen with con: No lesions in liver for HCC

## 2020-02-19 NOTE — PROGRESS NOTE ADULT - SUBJECTIVE AND OBJECTIVE BOX
SUBJECTIVE:  HPI:39 year old male patient, chronic alcohol abuse, homeless, no other past medical history presenting for epigastric pain.   The patient has been drinking a 12 pack beer daily as well as hard liquor (3 cups of Vodka daily)   He states that he has been experiencing epigastric pain for a week, radiating to the left side of the back, very severe, patient unsure of the quality of the pain, accompanied with subjective fever as well as diarrhea, bilious emesis and nausea. He otherwise denies hematemesis, hematochezia, melena, dyspnea, cough. He reports a chest pressure sensation that has been present for a month and that is exacerbated by PO intake.   In the ED , patient was found to be in alcohol withdrawal, tachycardic up to 128, and hypertensive, no fever, and CIWA protocol initiated, CIWA score of 4 noted, patient was started on Ativan taper, Thiamine, Folic acid and multivitamins (17 Feb 2020 01:26)    SUMMARY AND RECENT EVENTS  Patient is a 39y old Male who presents with a chief complaint of Alcohol abuse (19 Feb 2020 10:51)  Currently admitted to medicine with the primary diagnosis of Alcohol withdrawal. His CIWAS score in AM was 1  Today is hospital day 2d. This morning he is resting comfortably in bed. Pt was a code grey today, was agitated and aggressive.    Review of Systems:  General: denies fevers, chills, or weight changes  HEENT: denies headaches, visual changes, or vertigo  Resp: denies worsening dyspnea or cough  CVS: denies chest pain or palpitations  GI: denies diarrhea, abdominal pain, or melena  : denies dysuria, hematuria, frequency or urgency  Neuro/MSK: denies weakness or memory changes    USED  [id number]    PAST MEDICAL & SURGICAL HISTORY  Chronic alcohol abuse  No significant past surgical history      ALLERGIES:  Allergy Status Unknown    MEDICATIONS:  STANDING MEDICATIONS  chlorhexidine 4% Liquid 1 Application(s) Topical <User Schedule>  enoxaparin Injectable 40 milliGRAM(s) SubCutaneous daily  folic acid 1 milliGRAM(s) Oral daily  influenza   Vaccine 0.5 milliLiter(s) IntraMuscular once  lactated ringers. 1000 milliLiter(s) IV Continuous <Continuous>  lactulose Syrup 15 Gram(s) Oral two times a day  LORazepam   Injectable 1 milliGRAM(s) IV Push every 4 hours  LORazepam   Injectable 0.5 milliGRAM(s) IV Push every 4 hours  LORazepam   Injectable   IV Push   LORazepam   Injectable 1 milliGRAM(s) IV Push once  multivitamin 1 Tablet(s) Oral daily  pantoprazole  Injectable 40 milliGRAM(s) IV Push two times a day  potassium chloride  20 mEq/100 mL IVPB 20 milliEquivalent(s) IV Intermittent once  predniSONE   Tablet 40 milliGRAM(s) Oral daily  thiamine Injectable 100 milliGRAM(s) IntraMuscular once    PRN MEDICATIONS    VITALS:   T(F): 96.5  HR: 110  BP: 170/96  RR: 18  SpO2: 96%    LABS:                        11.5   5.63  )-----------( 78       ( 19 Feb 2020 07:44 )             35.6     02-19    141  |  105  |  6<L>  ----------------------------<  115<H>  3.3<L>   |  22  |  0.6<L>    Ca    8.5      19 Feb 2020 07:44    TPro  7.2  /  Alb  3.4<L>  /  TBili  2.7<H>  /  DBili  x   /  AST  64<H>  /  ALT  26  /  AlkPhos  110  02-19    PT/INR - ( 19 Feb 2020 07:44 )   PT: 20.00 sec;   INR: 1.74 ratio      PHYSICAL EXAM:  GEN: No acute distress  HEENT: normocephalic atraumatic, scleral icterus absent  LUNGS: Clear to auscultation bilaterally   HEART: S1/S2 present. RRR.   ABD: Soft, non-tender, non-distended. no RUQ tenderness  EXT: no edema  NEURO: AAOX3

## 2020-02-19 NOTE — PROGRESS NOTE ADULT - ASSESSMENT
39 year old male presents for EtOH withdrawal; found to have cirrhosis on the admission.     #EtOH withdrawal  -complete ativan taper today  -counselled on EtOH cessation, pt agreeable for EtOH rehab    #Cirrhosis likely secondary to EtOH abuse  -outpatient f/u  -EtOH cessation  -inpatient EGD to r/o varices and MW tear  -NPO after midnight for EGD ramin  -INR was elevated, given 10mg vitamin K PO today. INR in AM  -K+ ordered for patient today, patient agitated today, does not have IV access as he has pulled out several IV  -repeat PM BMP to ensure optimization    #DVT ppx: lovenox  #GI ppx: protonix  #Dispo: pending EGD, patient agreeable for EtOH rehab. Patient is homeless, claims he has no family

## 2020-02-19 NOTE — PROGRESS NOTE ADULT - SUBJECTIVE AND OBJECTIVE BOX
Gastroenterology progress note:     Patient is a 39y old  Male who presents with a chief complaint of Alcohol abuse (18 Feb 2020 10:47)       Admitted on: 02-17-20    We are following the patient for alcoholic hepatitis     Interval History: patient is tolerating DASH diet. No abdominal pain, vomiting. Feels significantly better.      PAST MEDICAL & SURGICAL HISTORY:  Chronic alcohol abuse  No significant past surgical history        Allergies  Allergy Status Unknown        FH: No significant FH    Social history: NO IV drug abuse    MEDICATIONS  (STANDING):  chlorhexidine 4% Liquid 1 Application(s) Topical <User Schedule>  enoxaparin Injectable 40 milliGRAM(s) SubCutaneous daily  folic acid 1 milliGRAM(s) Oral daily  influenza   Vaccine 0.5 milliLiter(s) IntraMuscular once  lactated ringers. 1000 milliLiter(s) (75 mL/Hr) IV Continuous <Continuous>  lactulose Syrup 15 Gram(s) Oral two times a day  LORazepam   Injectable 1 milliGRAM(s) IV Push every 4 hours  LORazepam   Injectable 0.5 milliGRAM(s) IV Push every 4 hours  LORazepam   Injectable   IV Push   multivitamin 1 Tablet(s) Oral daily  pantoprazole  Injectable 40 milliGRAM(s) IV Push two times a day  predniSONE   Tablet 40 milliGRAM(s) Oral daily  thiamine 100 milliGRAM(s) Oral daily  thiamine Injectable 100 milliGRAM(s) IntraMuscular once    MEDICATIONS  (PRN):        Review of Systems:   Constitutional: No acute distress  Eyes: No redness or jaundice  Skin: No rash or bruises  Cardiovascular:  No Chest Pain, No Palpitations  Respiratory:  No Cough, No Dyspnea  Gastrointestinal:  As described in HPI  Extremities: No leg swelling or pain  Neuro: No tremors or seizures      Physical Examination:  T(C): 36.2 (02-19-20 @ 06:24), Max: 36.8 (02-18-20 @ 16:50)  HR: 100 (02-19-20 @ 06:24) (99 - 100)  BP: 144/76 (02-19-20 @ 06:24) (138/83 - 156/96)  RR: 18 (02-19-20 @ 06:24) (18 - 18)  SpO2: 96% (02-18-20 @ 20:00) (96% - 99%)  Weight (kg): 76 (02-18-20 @ 19:03)    02-19-20 @ 07:01 - 02-19-20 @ 10:51  --------------------------------------------------------  IN: 280 mL / OUT: 600 mL / NET: -320 mL      Constitutional: Lying in bed, no acute distress  Neck: No thyromegaly or mass noted.  Respiratory:  No signs of respiratory distress. Lung sounds are clear bilaterally.  Cardiovascular:  S1 S2, Regular rate and rhythm.  Abdominal: Abdomen is soft, symmetric, and non-tender without distention. There are no visible lesions. Bowel sounds are present and normoactive in all four quadrants. No masses, hepatomegaly, or splenomegaly are noted.   Extremities: No pedal edema or cyanosis  Neuro: AAO X 3, no tremors          Data: (reviewed by attending)                        11.5   5.63  )-----------( 78       ( 19 Feb 2020 07:44 )             35.6     Hgb trend:  11.5  02-19-20 @ 07:44  11.2  02-18-20 @ 07:20  11.3  02-17-20 @ 07:32  12.1  02-16-20 @ 19:50        02-19    141  |  105  |  6<L>  ----------------------------<  115<H>  3.3<L>   |  22  |  0.6<L>    Ca    8.5      19 Feb 2020 07:44    TPro  7.2  /  Alb  3.4<L>  /  TBili  2.7<H>  /  DBili  x   /  AST  64<H>  /  ALT  26  /  AlkPhos  110  02-19    Liver panel trend:  TBili 2.7   /   AST 64   /   ALT 26   /   AlkP 110   /   Tptn 7.2   /   Alb 3.4    /   DBili --      02-19  TBili 2.9   /   AST 78   /   ALT 27   /   AlkP 124   /   Tptn 7.3   /   Alb 3.2    /   DBili 1.1      02-18  TBili 4.6   /   AST 74   /   ALT 25   /   AlkP 110   /   Tptn 7.1   /   Alb 3.2    /   DBili 1.5      02-17  TBili 4.7   /   AST 79   /   ALT 29   /   AlkP 132   /   Tptn 8.1   /   Alb 3.6    /   DBili --      02-16      PT/INR - ( 19 Feb 2020 07:44 )   PT: 20.00 sec;   INR: 1.74 ratio                Radiology: (reviewed by attending)

## 2020-02-19 NOTE — CHART NOTE - NSCHARTNOTEFT_GEN_A_CORE
Patient was on 1:1 sit and had a code grey earlier due to agitation. The patient was sitting in the bed when he could not maintain his balance and had a fall to the floor and as per the observation nurse hit his head. Patient is able to move all his extremities and offers no complaints of pain. But given the history of elevated INR will get a CT Head non contrast. Hospitalist on Dr. Benson called and informed. No contact number for any family member to be informed. Patient otherwise appears to have no change in mental status from earlier and is hemodynamically stable.

## 2020-02-20 PROCEDURE — 99233 SBSQ HOSP IP/OBS HIGH 50: CPT

## 2020-02-20 PROCEDURE — 93010 ELECTROCARDIOGRAM REPORT: CPT

## 2020-02-20 RX ORDER — DEXMEDETOMIDINE HYDROCHLORIDE IN 0.9% SODIUM CHLORIDE 4 UG/ML
0.5 INJECTION INTRAVENOUS
Qty: 200 | Refills: 0 | Status: DISCONTINUED | OUTPATIENT
Start: 2020-02-20 | End: 2020-02-24

## 2020-02-20 RX ORDER — THIAMINE MONONITRATE (VIT B1) 100 MG
100 TABLET ORAL DAILY
Refills: 0 | Status: COMPLETED | OUTPATIENT
Start: 2020-02-20 | End: 2020-02-22

## 2020-02-20 RX ORDER — SODIUM CHLORIDE 9 MG/ML
500 INJECTION INTRAMUSCULAR; INTRAVENOUS; SUBCUTANEOUS ONCE
Refills: 0 | Status: DISCONTINUED | OUTPATIENT
Start: 2020-02-20 | End: 2020-02-20

## 2020-02-20 RX ORDER — HALOPERIDOL DECANOATE 100 MG/ML
5 INJECTION INTRAMUSCULAR ONCE
Refills: 0 | Status: COMPLETED | OUTPATIENT
Start: 2020-02-20 | End: 2020-02-20

## 2020-02-20 RX ORDER — PHYTONADIONE (VIT K1) 5 MG
10 TABLET ORAL DAILY
Refills: 0 | Status: COMPLETED | OUTPATIENT
Start: 2020-02-20 | End: 2020-02-22

## 2020-02-20 RX ADMIN — Medication 40 MILLIGRAM(S): at 05:18

## 2020-02-20 RX ADMIN — Medication 0.5 MILLIGRAM(S): at 02:13

## 2020-02-20 RX ADMIN — Medication 1 MILLIGRAM(S): at 05:18

## 2020-02-20 RX ADMIN — SODIUM CHLORIDE 75 MILLILITER(S): 9 INJECTION, SOLUTION INTRAVENOUS at 07:01

## 2020-02-20 RX ADMIN — DEXMEDETOMIDINE HYDROCHLORIDE IN 0.9% SODIUM CHLORIDE 9.5 MICROGRAM(S)/KG/HR: 4 INJECTION INTRAVENOUS at 20:30

## 2020-02-20 RX ADMIN — ENOXAPARIN SODIUM 40 MILLIGRAM(S): 100 INJECTION SUBCUTANEOUS at 12:26

## 2020-02-20 RX ADMIN — PANTOPRAZOLE SODIUM 40 MILLIGRAM(S): 20 TABLET, DELAYED RELEASE ORAL at 05:18

## 2020-02-20 RX ADMIN — LACTULOSE 15 GRAM(S): 10 SOLUTION ORAL at 05:23

## 2020-02-20 RX ADMIN — Medication 2 MILLIGRAM(S): at 08:28

## 2020-02-20 RX ADMIN — Medication 2 MILLIGRAM(S): at 20:28

## 2020-02-20 RX ADMIN — HALOPERIDOL DECANOATE 5 MILLIGRAM(S): 100 INJECTION INTRAMUSCULAR at 07:41

## 2020-02-20 RX ADMIN — Medication 6 MILLIGRAM(S): at 14:57

## 2020-02-20 RX ADMIN — Medication 2 MILLIGRAM(S): at 12:25

## 2020-02-20 RX ADMIN — Medication 2 MILLIGRAM(S): at 13:44

## 2020-02-20 RX ADMIN — PANTOPRAZOLE SODIUM 40 MILLIGRAM(S): 20 TABLET, DELAYED RELEASE ORAL at 19:02

## 2020-02-20 RX ADMIN — Medication 2 MILLIGRAM(S): at 20:43

## 2020-02-20 RX ADMIN — Medication 2 MILLIGRAM(S): at 19:02

## 2020-02-20 RX ADMIN — Medication 2 MILLIGRAM(S): at 07:37

## 2020-02-20 RX ADMIN — Medication 2 MILLIGRAM(S): at 11:43

## 2020-02-20 RX ADMIN — CHLORHEXIDINE GLUCONATE 1 APPLICATION(S): 213 SOLUTION TOPICAL at 05:18

## 2020-02-20 RX ADMIN — Medication 2 MILLIGRAM(S): at 21:06

## 2020-02-20 RX ADMIN — Medication 2 MILLIGRAM(S): at 11:00

## 2020-02-20 NOTE — CHART NOTE - NSCHARTNOTEFT_GEN_A_CORE
HPI:25 yo male with PMH of obesity, had sleeve gastrectomy in 2015 complicated by severe mal nutritional causing b/l lower extremity paralysis 6 months late to gastrectomy for upto 4 months which improved later with physical therapy and rehab. The patient presented with b/l LE pain 8/10, fluctuating, stabbing and electric shock like radiating form groins to feet. Patient usually walk with cane at baseline but from last 1 week his left knee started giving way and he fell.  In ED vitally stable and admitted for weakness, pain management and to rule out vitamin deficiency. (16 Feb 2020 02:34)    HOSPITAL COURSE: Patient was admitted for management of EtOH withdrawal, ativan taper and CIWAS was initiated. CT and US showed findings consistent with cirrhosis. Patient INR was elevated. GI was consulted, plan was for endoscopy 2/20 to r/o varices. On 2/19, pt was alert and orientated in AM. Mental status deteriorated in 2/19 PM, pt was seen jumping on bed and sustained a fall. CT head for fall was done, was negative.     REASON FOR UPGRADE: Patient disorientated in AM of 2/20. Did not respond to ativan 1mg, then 2mg, then haldol 5mg. ICU was contacted. Taper was changed to 2mg ativan Q2. Pulm fellow assessed patient, [facility] ICU care. Pt required several pushes of Ativan, received >10mg IV/IM since 6AM; prior to ICU upgrade today    2 EKGS were performed 2/20 AM as patient was tachycardic to 140s-150s. EKG showing sinus tachycardia times 2. Patient had low grade temperature of 99      Physical Exam  General: appears agitated, combative   HEENT: normocephalic, atraumatic, no scleral icteric  Lungs: Clear to auscultate  Heart: tachycardic on monitor to 140s  Abdomen: non tender, no distension, no RUQ tenderness  Ext: 4 pt restraints. no edema, hand on left side is purple from restraints  Neuro: AO times 0 (attempted to communicate in Malay), nonsensical conversations          ASSESSMENT    39 year old male presents for EtOH withdrawal; found to have cirrhosis on the admission. Patient developed overt Delirium Tremens with diaphoresis, tachycardia, and altered mental status.     #EtOH withdrawal-complicated by development of delirium tremens   -last drink estimated to be ~96 hours ago  -was due to complete ativan taper today  -continue with ativan pushes  -monitor patient airway status  -CT head was negative yesterday  -doubtful this is hepatic encephalopathy as patient appears aggressive as opposed to lethargic, but follow up 16:00 ammonia levels    #Cirrhosis likely secondary to EtOH abuse  -outpatient f/u  -EtOH cessation  -inpatient EGD to r/o varices and MW tear  -plan was for EGD today, cancelled because of DT  -INR was elevated, given 10mg vitamin K PO yesterday    #DVT ppx: lovenox  #GI ppx: protonix      TO DO  -Patient needs   -Follow up 16:00 labs (were not ordered for AM as plan was for scope this morning, no routine labs on morning of scope per GI)  -Patient does not have IV access, was agitated and pulled out 5 IVs in last 24 hours. Unable to safely insert another IV as patient is agitated  -Consider imaging of Left Hand, patient may have injured himself when on 4-point restraints  -EGD plan as per GI  -outpatient cirrhosis follow up HPI:27 yo male with PMH of obesity, had sleeve gastrectomy in 2015 complicated by severe mal nutritional causing b/l lower extremity paralysis 6 months late to gastrectomy for upto 4 months which improved later with physical therapy and rehab. The patient presented with b/l LE pain 8/10, fluctuating, stabbing and electric shock like radiating form groins to feet. Patient usually walk with cane at baseline but from last 1 week his left knee started giving way and he fell.  In ED vitally stable and admitted for weakness, pain management and to rule out vitamin deficiency. (16 Feb 2020 02:34)    HOSPITAL COURSE: Patient was admitted for management of EtOH withdrawal, ativan taper and CIWAS was initiated. CT and US showed findings consistent with cirrhosis. Patient INR was elevated. GI was consulted, plan was for endoscopy 2/20 to r/o varices. On 2/19, pt was alert and orientated in AM. Mental status deteriorated in 2/19 PM, pt was seen jumping on bed and sustained a fall. CT head for fall was done, was negative.     REASON FOR UPGRADE: Patient disorientated in AM of 2/20. Did not respond to ativan 1mg, then 2mg, then haldol 5mg. ICU was contacted. Taper was changed to 2mg ativan Q2. Pulm fellow assessed patient, [facility] ICU care. Pt required several pushes of Ativan, received >10mg IV/IM since 6AM; prior to ICU upgrade today    2 EKGS were performed 2/20 AM as patient was tachycardic to 140s-150s. EKG showing sinus tachycardia times 2. Patient had low grade temperature of 99      Physical Exam  General: appears agitated, combative   HEENT: normocephalic, atraumatic, no scleral icteric  Lungs: Clear to auscultate  Heart: tachycardic on monitor to 140s  Abdomen: non tender, no distension, no RUQ tenderness  Ext: 4 pt restraints. no edema, hand on left side is purple from restraints  Neuro: AO times 0 (attempted to communicate in Estonian), nonsensical conversations          ASSESSMENT    39 year old male presents for EtOH withdrawal; found to have cirrhosis on the admission. Patient developed overt Delirium Tremens with diaphoresis, tachycardia, and altered mental status. CIWA score today was 25    #EtOH withdrawal-complicated by development of delirium tremens   -last drink estimated to be ~96 hours ago  -was due to complete ativan taper today  -continue with ativan pushes  -monitor patient airway status  -CT head was negative yesterday  -doubtful this is hepatic encephalopathy as patient appears aggressive as opposed to lethargic, but follow up 16:00 ammonia levels    #Cirrhosis likely secondary to EtOH abuse  -outpatient f/u  -EtOH cessation  -inpatient EGD to r/o varices and MW tear  -plan was for EGD today, cancelled because of DT  -INR was elevated, given 10mg vitamin K PO yesterday    #DVT ppx: lovenox  #GI ppx: protonix BID      TO DO  -Patient needs better control of active sx  -Follow up 16:00 labs (were not ordered for AM as plan was for scope this morning, no routine labs on morning of scope per GI)  -Patient does not have IV access, was agitated and pulled out 5 IVs in last 24 hours. Unable to safely insert another IV as patient is agitated  -Consider imaging of Left Hand, patient may have injured himself when on 4-point restraints  -EGD plan as per GI  -outpatient cirrhosis follow up  -c/w prednisolone 40mg PO (today is day 4)

## 2020-02-20 NOTE — CONSULT NOTE ADULT - ASSESSMENT
IMPRESSION:    Severe ETOH withdrawal  HO etoh dependance    PLAN:    CNS: No CNS depressants. Ativan 2mg q30 min and frequent CIWA monitoring. Thiamine and Folate supplementation. Neuro eval    HEENT: oral care    PULMONARY: HOB >30    CARDIOVASCULAR: Keep i=o. Bp and HR monitoring.    GI: GI prophylaxis. NPO. Aspiration precautions    RENAL: fu lytes. correct as needed    INFECTIOUS DISEASE: no antibx    HEMATOLOGICAL:  DVT prophylaxis.    ENDOCRINE:  Follow up FS.  Insulin protocol if needed.    Will need MICU monitoring for now IMPRESSION:    Severe ETOH withdrawal/ DT'S  HO etoh dependance  ? alcoholic hepatitis  thrombocytopenia    PLAN:    CNS: No CNS depressants. Ativan per CIWA protocol. Thiamine and Folate supplementation. precedex    HEENT: oral care    PULMONARY: HOB >30, aspiration precaution    CARDIOVASCULAR: IVF. Bp  echo    GI: GI prophylaxis. NPO. GI f/up prednisone per GI    RENAL: fu lytes. correct as needed    INFECTIOUS DISEASE: pancx    HEMATOLOGICAL:  DVT prophylaxis.    ENDOCRINE:  Follow up FS.  Insulin protocol if needed.    Will need MICU monitoring for now

## 2020-02-20 NOTE — PROGRESS NOTE ADULT - SUBJECTIVE AND OBJECTIVE BOX
NAZARIO WASHINGTON  39y, Male  Allergy: Allergy Status Unknown  Hospital Day: 3d      Patient was seen and examined at bedside. acutely delirious speaking with pt in Swedish not responding appropriately attempting to get out of restraints unable to redirect pt.            VITALS:  T(F): 99.6 (02-20-20 @ 13:03), Max: 99.6 (02-20-20 @ 13:03)  HR: 144 (02-20-20 @ 13:03)  BP: 150/90 (02-20-20 @ 13:03) (141/91 - 170/96)  RR: 19 (02-20-20 @ 11:28)  SpO2: 98% (02-20-20 @ 06:44)    PHYSICAL EXAM:  GENERAL: WDWN male diaphoretic acutely delirious   NECK: No evidence of swelling no palpable lymphadenopathy  CHEST/LUNG: clear to ausculation bilaterally no wheezes, rales, or rhonchi   HEART/VASC: tachycardic , No murmurs, rubs, or gallops appreciated, 2+ equal bilateral radial pulse  ABDOMEN: Soft, non tender non distended +bowel sounds in all quadrants, no palpable organomegaly   EXTREMITIES:  No clubbing and range of motion unable to assess pt in 4points for safety     TESTS & MEASUREMENTS:  Weight (Kg): 76 (02-18-20 @ 19:03)  BMI (kg/m2): 31.7 (02-18)    02-19-20 @ 07:01  -  02-20-20 @ 07:00  --------------------------------------------------------  IN: 280 mL / OUT: 600 mL / NET: -320 mL                            11.5   5.63  )-----------( 78       ( 19 Feb 2020 07:44 )             35.6     PT/INR - ( 19 Feb 2020 07:44 )   PT: 20.00 sec;   INR: 1.74 ratio           02-19    139  |  104  |  6<L>  ----------------------------<  114<H>  3.8   |  22  |  0.7    Ca    8.5      19 Feb 2020 22:34    TPro  7.2  /  Alb  3.4<L>  /  TBili  2.7<H>  /  DBili  x   /  AST  64<H>  /  ALT  26  /  AlkPhos  110  02-19    LIVER FUNCTIONS - ( 19 Feb 2020 07:44 )  Alb: 3.4 g/dL / Pro: 7.2 g/dL / ALK PHOS: 110 U/L / ALT: 26 U/L / AST: 64 U/L / GGT: x               MEDICATIONS:  MEDICATIONS  (STANDING):  chlorhexidine 4% Liquid 1 Application(s) Topical <User Schedule>  enoxaparin Injectable 40 milliGRAM(s) SubCutaneous daily  folic acid 1 milliGRAM(s) Oral daily  influenza   Vaccine 0.5 milliLiter(s) IntraMuscular once  lactated ringers. 1000 milliLiter(s) (75 mL/Hr) IV Continuous <Continuous>  lactulose Syrup 15 Gram(s) Oral two times a day  LORazepam   Injectable 2 milliGRAM(s) IV Push every 2 hours  multivitamin 1 Tablet(s) Oral daily  pantoprazole  Injectable 40 milliGRAM(s) IV Push two times a day  predniSONE   Tablet 40 milliGRAM(s) Oral daily  thiamine 100 milliGRAM(s) Oral daily  thiamine Injectable 100 milliGRAM(s) IntraMuscular once

## 2020-02-20 NOTE — PROGRESS NOTE ADULT - ASSESSMENT
40yo M admitted for etoh withdrawal now with newly diagnosed cirrhosis     #etoh withdrawal  acute progression to DTs  CIWA 25  -ICU eval likely will need closer monitoring and ativan drip   -restraints for pt safety acutely hallucinating    #cirrhosis likely due to alcohol abuse   -planning for inpt EGD to r/o varices-->on hold given acute decompensation of withdrawal symptoms   -GI on board   -trend labs liver enzymes, CMP, INR daily  -monitor mental status   -Prednisolone 40mg daily for alcoholic hepatitis (Day 4)  -cont ppi q12h      Progress Note Handoff  Pending:  EGD, mental status, ativan, ICU eval   dispo: unknown at this time  Disposition: unclear at this time .

## 2020-02-20 NOTE — PROGRESS NOTE ADULT - ASSESSMENT
39 year old male patient, homeless, active alcohol abuse, no other known past medical history presenting for epigastric pain and vomiting for the last 2 days.    # Abdominal pain/diarrhea/vomiting/elevated LFTS: Likely acute alcoholic hepatitis: improving significantly  Alcoholic liver cirrhosis (alcohol, thrombocytopenia, elevated LFTS, AST/ALT ratio reversal, US abdomen showed nodular liver)  Mental status intact, Coagulopathy +  Lilie's score 0.016 likely patient has good prognosis on day # 3  CLD work up: HBC< HCV neg, H/O HAV infection, ceruloplasmin neg, iron studies neg for iron overload (ferritin normal), MARCUS, AMA, SMA, Anti HEV neg  Gamma Globulin slightly elevated    Rec:  Please trend liver enzymes, CMP, INR daily.  Monitor electrolytes, mental status.  c/w Prednisolone 40mg daily for alcoholic hepatitis (Day #4) and re-evaluation on Day 7 of Lilie's score    Alcohol abuse:  Ativan protocol for alcohol withdrawal.  RL at 75cc/hr  Folate, MVI and thiamine  Alcohol cessation    # Vomiting with blood tinge : Likely Aziza Hcau vs PUD vs erosive esophagitis vs erosive gastritis/duodenitis   Hemodynamically stable  No active bleeding  Hg stable    Rec:  c/w protonix 40mg BID  Trend CBC daily  Give Vit K 10mg daily for 3 doses.  Will need EGD once medically stable before discharge.      # Esophageal varices  needs screening EGD    #No ascites on US abdomen  No Known H/O HE    Coagulopathy:  Monitor INR daily.  c/w Vit K 10mg oral daily for 3 days.      # HCC screening:  AFP neg  CT abdomen with con: No lesions in liver for HCC  Repeat US abdomen and AFP in 6 months

## 2020-02-20 NOTE — PROGRESS NOTE ADULT - SUBJECTIVE AND OBJECTIVE BOX
Gastroenterology progress note:     Patient is a 39y old  Male who presents with a chief complaint of Alcohol abuse (20 Feb 2020 13:06)       Admitted on: 02-17-20    We are following the patient for alcoholic hepatitis     Interval History: Patient is very agitated and likely having alcohol withdrawal. Normal BM yesterday and on regular diet.      PAST MEDICAL & SURGICAL HISTORY:  Chronic alcohol abuse  No significant past surgical history        Allergies  Allergy Status Unknown        FH: No significant FH    Social history: NO IV drug abuse    MEDICATIONS  (STANDING):  chlorhexidine 4% Liquid 1 Application(s) Topical <User Schedule>  enoxaparin Injectable 40 milliGRAM(s) SubCutaneous daily  folic acid 1 milliGRAM(s) Oral daily  influenza   Vaccine 0.5 milliLiter(s) IntraMuscular once  lactated ringers. 1000 milliLiter(s) (75 mL/Hr) IV Continuous <Continuous>  lactulose Syrup 15 Gram(s) Oral two times a day  LORazepam   Injectable 2 milliGRAM(s) IV Push every 2 hours  multivitamin 1 Tablet(s) Oral daily  pantoprazole  Injectable 40 milliGRAM(s) IV Push two times a day  predniSONE   Tablet 40 milliGRAM(s) Oral daily  thiamine 100 milliGRAM(s) Oral daily  thiamine Injectable 100 milliGRAM(s) IntraMuscular once    MEDICATIONS  (PRN):        Review of Systems: Unobtainable    Physical Examination:  T(C): 37.6 (02-20-20 @ 13:03), Max: 37.6 (02-20-20 @ 13:03)  HR: 144 (02-20-20 @ 13:03) (101 - 152)  BP: 150/90 (02-20-20 @ 13:03) (141/91 - 170/96)  RR: 19 (02-20-20 @ 11:28) (18 - 20)  SpO2: 98% (02-20-20 @ 06:44) (98% - 98%)      02-19-20 @ 07:01  -  02-20-20 @ 07:00  --------------------------------------------------------  IN: 280 mL / OUT: 600 mL / NET: -320 mL      Constitutional: Lying in bed, restless  Neck: No thyromegaly or mass noted.  Respiratory:  No signs of respiratory distress. Lung sounds are clear bilaterally.  Cardiovascular:  S1 S2, Regular rate and rhythm.  Abdominal: Abdomen is soft, symmetric, and non-tender without distention. There are no visible lesions. Bowel sounds are present and normoactive in all four quadrants. No masses, or splenomegaly are noted.   Extremities: No pedal edema or cyanosis  Neuro: fine tremors in hands, restless          Data: (reviewed by attending)                        11.5   5.63  )-----------( 78       ( 19 Feb 2020 07:44 )             35.6     Hgb trend:  11.5  02-19-20 @ 07:44  11.2  02-18-20 @ 07:20        02-19    139  |  104  |  6<L>  ----------------------------<  114<H>  3.8   |  22  |  0.7    Ca    8.5      19 Feb 2020 22:34    TPro  7.2  /  Alb  3.4<L>  /  TBili  2.7<H>  /  DBili  x   /  AST  64<H>  /  ALT  26  /  AlkPhos  110  02-19    Liver panel trend:  TBili 2.7   /   AST 64   /   ALT 26   /   AlkP 110   /   Tptn 7.2   /   Alb 3.4    /   DBili --      02-19  TBili 2.9   /   AST 78   /   ALT 27   /   AlkP 124   /   Tptn 7.3   /   Alb 3.2    /   DBili 1.1      02-18  TBili 4.6   /   AST 74   /   ALT 25   /   AlkP 110   /   Tptn 7.1   /   Alb 3.2    /   DBili 1.5      02-17  TBili 4.7   /   AST 79   /   ALT 29   /   AlkP 132   /   Tptn 8.1   /   Alb 3.6    /   DBili --      02-16      PT/INR - ( 19 Feb 2020 07:44 )   PT: 20.00 sec;   INR: 1.74 ratio                Radiology: (reviewed by attending)

## 2020-02-20 NOTE — CONSULT NOTE ADULT - SUBJECTIVE AND OBJECTIVE BOX
Patient is a 39y old  Male who presents with a chief complaint of Alcohol abuse (19 Feb 2020 17:57)      HPI:  39 year old male patient, chronic alcohol abuse, homeless, no other past medical history presenting for epigastric pain.   The patient has been drinking a 12 pack beer daily as well as hard liquor (3 cups of Vodka daily)   He states that he has been experiencing epigastric pain for a week, radiating to the left side of the back, very severe, patient unsure of the quality of the pain, accompanied with subjective fever as well as diarrhea, bilious emesis and nausea. He otherwise denies hematemesis, hematochezia, melena, dyspnea, cough. He reports a chest pressure sensation that has been present for a month and that is exacerbated by PO intake.     In the ED , patient was found to be in alcohol withdrawal, tachycardic up to 128, and hypertensive, no fever, and CIWA protocol initiated, CIWA score of 4 noted, patient was started on Ativan taper, Thiamine, Folic acid and multivitamins (17 Feb 2020 01:26)      PAST MEDICAL & SURGICAL HISTORY:  Chronic alcohol abuse  No significant past surgical history      SOCIAL HX:   unknown social history    FAMILY HISTORY:  No pertinent family history in first degree relatives  :  No known cardiovacular family hisotry     ROS:  See HPI     Allergies    Allergy Status Unknown    PHYSICAL EXAM    ICU Vital Signs Last 24 Hrs  T(C): 37.6 (20 Feb 2020 13:03), Max: 37.6 (20 Feb 2020 13:03)  T(F): 99.6 (20 Feb 2020 13:03), Max: 99.6 (20 Feb 2020 13:03)  HR: 144 (20 Feb 2020 13:03) (101 - 152)  BP: 150/90 (20 Feb 2020 13:03) (141/91 - 170/96)  BP(mean): --  ABP: --  ABP(mean): --  RR: 19 (20 Feb 2020 11:28) (18 - 20)  SpO2: 98% (20 Feb 2020 06:44) (98% - 98%)      General: AAOx2. Agitated. Diaphoretic  HEENT:  CAMELIA              Lymphatic system: No cervical LN   Lungs: clear to ascultation bilaterally  Cardiovascular: tachycardia  Gastrointestinal: Soft, Positive BS  Musculoskeletal: No clubbing.  Moves all extremities.  Full range of motion   Skin: Warm.  Intact  Neurological: +tremor. moves all extremities      02-19-20 @ 07:01  -  02-20-20 @ 07:00  --------------------------------------------------------  IN:    Oral Fluid: 280 mL  Total IN: 280 mL    OUT:    Voided: 600 mL  Total OUT: 600 mL    Total NET: -320 mL          LABS:                          11.5   5.63  )-----------( 78       ( 19 Feb 2020 07:44 )             35.6                                               02-19    139  |  104  |  6<L>  ----------------------------<  114<H>  3.8   |  22  |  0.7    Ca    8.5      19 Feb 2020 22:34    TPro  7.2  /  Alb  3.4<L>  /  TBili  2.7<H>  /  DBili  x   /  AST  64<H>  /  ALT  26  /  AlkPhos  110  02-19      PT/INR - ( 19 Feb 2020 07:44 )   PT: 20.00 sec;   INR: 1.74 ratio                                                                                              LIVER FUNCTIONS - ( 19 Feb 2020 07:44 )  Alb: 3.4 g/dL / Pro: 7.2 g/dL / ALK PHOS: 110 U/L / ALT: 26 U/L / AST: 64 U/L / GGT: x                                                                                               MEDICATIONS  (STANDING):  chlorhexidine 4% Liquid 1 Application(s) Topical <User Schedule>  enoxaparin Injectable 40 milliGRAM(s) SubCutaneous daily  folic acid 1 milliGRAM(s) Oral daily  influenza   Vaccine 0.5 milliLiter(s) IntraMuscular once  lactated ringers. 1000 milliLiter(s) (75 mL/Hr) IV Continuous <Continuous>  lactulose Syrup 15 Gram(s) Oral two times a day  LORazepam   Injectable 2 milliGRAM(s) IV Push every 2 hours  multivitamin 1 Tablet(s) Oral daily  pantoprazole  Injectable 40 milliGRAM(s) IV Push two times a day  predniSONE   Tablet 40 milliGRAM(s) Oral daily  thiamine 100 milliGRAM(s) Oral daily  thiamine Injectable 100 milliGRAM(s) IntraMuscular once Patient is a 39y old  Male who presents with a chief complaint of Alcohol abuse (19 Feb 2020 17:57)      HPI:  39 year old male patient, alcohol abuse, homeless, no other past medical history presenting for epigastric pain. The patient has been drinking a 12 pack beer daily as well as hard liquor (3 cups of Vodka daily) .He states that he has been experiencing epigastric pain for a week, radiating to the left side of the back, very severe, patient unsure of the quality of the pain, accompanied with subjective fever as well as diarrhea, bilious emesis and nausea. He otherwise denies hematemesis, hematochezia, melena, dyspnea, cough. He reports a chest pressure sensation that has been present for a month and that is exacerbated by PO intake.     In the ED , patient was found to be in alcohol withdrawal, tachycardic up to 128, and hypertensive, no fever, and CIWA protocol initiated, CIWA score of 4 noted, patient was started on Ativan taper, Thiamine, Folic acid and multivitamins (17 Feb 2020 01:26) admitted to floor was seen by GI worsening status increasing agitation, DT, called for MICU      PAST MEDICAL & SURGICAL HISTORY:  Chronic alcohol abuse  No significant past surgical history      SOCIAL HX:   unknown social history    FAMILY HISTORY:  No pertinent family history in first degree relatives  :  No known cardiovacular family hisotry     ROS:  See HPI     Allergies    Allergy Status Unknown    PHYSICAL EXAM    ICU Vital Signs Last 24 Hrs  T(C): 37.6 (20 Feb 2020 13:03), Max: 37.6 (20 Feb 2020 13:03)  T(F): 99.6 (20 Feb 2020 13:03), Max: 99.6 (20 Feb 2020 13:03)  HR: 144 (20 Feb 2020 13:03) (101 - 152)  BP: 150/90 (20 Feb 2020 13:03) (141/91 - 170/96)  RR: 19 (20 Feb 2020 11:28) (18 - 20)  SpO2: 98% (20 Feb 2020 06:44) (98% - 98%)      General: AAOx1. Agitated. Diaphoretic  HEENT:  CAMELIA              Lymphatic system: No cervical LN   Lungs: clear to ascultation bilaterally  Cardiovascular: tachycardia  Gastrointestinal: Soft, Positive BS  Musculoskeletal: No clubbing.  Moves all extremities.  Full range of motion   Skin: Warm.  Intact  Neurological: +tremor. moves all extremities      02-19-20 @ 07:01  -  02-20-20 @ 07:00  --------------------------------------------------------  IN:    Oral Fluid: 280 mL  Total IN: 280 mL    OUT:    Voided: 600 mL  Total OUT: 600 mL    Total NET: -320 mL          LABS:                          11.5   5.63  )-----------( 78       ( 19 Feb 2020 07:44 )             35.6                                               02-19    139  |  104  |  6<L>  ----------------------------<  114<H>  3.8   |  22  |  0.7    Ca    8.5      19 Feb 2020 22:34    TPro  7.2  /  Alb  3.4<L>  /  TBili  2.7<H>  /  DBili  x   /  AST  64<H>  /  ALT  26  /  AlkPhos  110  02-19      PT/INR - ( 19 Feb 2020 07:44 )   PT: 20.00 sec;   INR: 1.74 ratio                                                                                              LIVER FUNCTIONS - ( 19 Feb 2020 07:44 )  Alb: 3.4 g/dL / Pro: 7.2 g/dL / ALK PHOS: 110 U/L / ALT: 26 U/L / AST: 64 U/L / GGT: x                                                                                               MEDICATIONS  (STANDING):  chlorhexidine 4% Liquid 1 Application(s) Topical <User Schedule>  enoxaparin Injectable 40 milliGRAM(s) SubCutaneous daily  folic acid 1 milliGRAM(s) Oral daily  influenza   Vaccine 0.5 milliLiter(s) IntraMuscular once  lactated ringers. 1000 milliLiter(s) (75 mL/Hr) IV Continuous <Continuous>  lactulose Syrup 15 Gram(s) Oral two times a day  LORazepam   Injectable 2 milliGRAM(s) IV Push every 2 hours  multivitamin 1 Tablet(s) Oral daily  pantoprazole  Injectable 40 milliGRAM(s) IV Push two times a day  predniSONE   Tablet 40 milliGRAM(s) Oral daily  thiamine 100 milliGRAM(s) Oral daily  thiamine Injectable 100 milliGRAM(s) IntraMuscular once

## 2020-02-21 LAB
ALBUMIN SERPL ELPH-MCNC: 3 G/DL — LOW (ref 3.5–5.2)
ALP SERPL-CCNC: 94 U/L — SIGNIFICANT CHANGE UP (ref 30–115)
ALT FLD-CCNC: 28 U/L — SIGNIFICANT CHANGE UP (ref 0–41)
AMMONIA BLD-MCNC: 89 UMOL/L — HIGH (ref 11–55)
ANION GAP SERPL CALC-SCNC: 12 MMOL/L — SIGNIFICANT CHANGE UP (ref 7–14)
ANION GAP SERPL CALC-SCNC: 13 MMOL/L — SIGNIFICANT CHANGE UP (ref 7–14)
ANISOCYTOSIS BLD QL: SLIGHT — SIGNIFICANT CHANGE UP
APPEARANCE UR: CLEAR — SIGNIFICANT CHANGE UP
APTT BLD: 32.8 SEC — SIGNIFICANT CHANGE UP (ref 27–39.2)
AST SERPL-CCNC: 63 U/L — HIGH (ref 0–41)
BACTERIA # UR AUTO: NEGATIVE — SIGNIFICANT CHANGE UP
BASOPHILS # BLD AUTO: 0 K/UL — SIGNIFICANT CHANGE UP (ref 0–0.2)
BASOPHILS NFR BLD AUTO: 0 % — SIGNIFICANT CHANGE UP (ref 0–1)
BILIRUB SERPL-MCNC: 2.9 MG/DL — HIGH (ref 0.2–1.2)
BILIRUB UR-MCNC: ABNORMAL
BUN SERPL-MCNC: 13 MG/DL — SIGNIFICANT CHANGE UP (ref 10–20)
BUN SERPL-MCNC: 9 MG/DL — LOW (ref 10–20)
CALCIUM SERPL-MCNC: 8 MG/DL — LOW (ref 8.5–10.1)
CALCIUM SERPL-MCNC: 8.5 MG/DL — SIGNIFICANT CHANGE UP (ref 8.5–10.1)
CHLORIDE SERPL-SCNC: 108 MMOL/L — SIGNIFICANT CHANGE UP (ref 98–110)
CHLORIDE SERPL-SCNC: 108 MMOL/L — SIGNIFICANT CHANGE UP (ref 98–110)
CO2 SERPL-SCNC: 22 MMOL/L — SIGNIFICANT CHANGE UP (ref 17–32)
CO2 SERPL-SCNC: 22 MMOL/L — SIGNIFICANT CHANGE UP (ref 17–32)
COLOR SPEC: ABNORMAL
CREAT SERPL-MCNC: 0.7 MG/DL — SIGNIFICANT CHANGE UP (ref 0.7–1.5)
CREAT SERPL-MCNC: 0.7 MG/DL — SIGNIFICANT CHANGE UP (ref 0.7–1.5)
DIFF PNL FLD: NEGATIVE — SIGNIFICANT CHANGE UP
EOSINOPHIL # BLD AUTO: 0 K/UL — SIGNIFICANT CHANGE UP (ref 0–0.7)
EOSINOPHIL NFR BLD AUTO: 0 % — SIGNIFICANT CHANGE UP (ref 0–8)
EPI CELLS # UR: 0 /HPF — SIGNIFICANT CHANGE UP (ref 0–5)
GIANT PLATELETS BLD QL SMEAR: PRESENT — SIGNIFICANT CHANGE UP
GLUCOSE SERPL-MCNC: 103 MG/DL — HIGH (ref 70–99)
GLUCOSE SERPL-MCNC: 136 MG/DL — HIGH (ref 70–99)
GLUCOSE UR QL: NEGATIVE — SIGNIFICANT CHANGE UP
HCT VFR BLD CALC: 33.1 % — LOW (ref 42–52)
HGB BLD-MCNC: 10.7 G/DL — LOW (ref 14–18)
HYALINE CASTS # UR AUTO: 0 /LPF — SIGNIFICANT CHANGE UP (ref 0–7)
INR BLD: 1.7 RATIO — HIGH (ref 0.65–1.3)
KETONES UR-MCNC: NEGATIVE — SIGNIFICANT CHANGE UP
LEUKOCYTE ESTERASE UR-ACNC: NEGATIVE — SIGNIFICANT CHANGE UP
LYMPHOCYTES # BLD AUTO: 1.16 K/UL — LOW (ref 1.2–3.4)
LYMPHOCYTES # BLD AUTO: 28.1 % — SIGNIFICANT CHANGE UP (ref 20.5–51.1)
MAGNESIUM SERPL-MCNC: 1.6 MG/DL — LOW (ref 1.8–2.4)
MAGNESIUM SERPL-MCNC: 1.7 MG/DL — LOW (ref 1.8–2.4)
MANUAL SMEAR VERIFICATION: SIGNIFICANT CHANGE UP
MCHC RBC-ENTMCNC: 24.7 PG — LOW (ref 27–31)
MCHC RBC-ENTMCNC: 32.3 G/DL — SIGNIFICANT CHANGE UP (ref 32–37)
MCV RBC AUTO: 76.3 FL — LOW (ref 80–94)
MONOCYTES # BLD AUTO: 0.36 K/UL — SIGNIFICANT CHANGE UP (ref 0.1–0.6)
MONOCYTES NFR BLD AUTO: 8.8 % — SIGNIFICANT CHANGE UP (ref 1.7–9.3)
NEUTROPHILS # BLD AUTO: 2.53 K/UL — SIGNIFICANT CHANGE UP (ref 1.4–6.5)
NEUTROPHILS NFR BLD AUTO: 61.4 % — SIGNIFICANT CHANGE UP (ref 42.2–75.2)
NITRITE UR-MCNC: NEGATIVE — SIGNIFICANT CHANGE UP
OVALOCYTES BLD QL SMEAR: SLIGHT — SIGNIFICANT CHANGE UP
PH UR: 6 — SIGNIFICANT CHANGE UP (ref 5–8)
PHOSPHATE SERPL-MCNC: 4.7 MG/DL — SIGNIFICANT CHANGE UP (ref 2.1–4.9)
PLAT MORPH BLD: NORMAL — SIGNIFICANT CHANGE UP
PLATELET # BLD AUTO: 77 K/UL — LOW (ref 130–400)
POIKILOCYTOSIS BLD QL AUTO: SLIGHT — SIGNIFICANT CHANGE UP
POLYCHROMASIA BLD QL SMEAR: SLIGHT — SIGNIFICANT CHANGE UP
POTASSIUM SERPL-MCNC: 3.3 MMOL/L — LOW (ref 3.5–5)
POTASSIUM SERPL-MCNC: 3.7 MMOL/L — SIGNIFICANT CHANGE UP (ref 3.5–5)
POTASSIUM SERPL-SCNC: 3.3 MMOL/L — LOW (ref 3.5–5)
POTASSIUM SERPL-SCNC: 3.7 MMOL/L — SIGNIFICANT CHANGE UP (ref 3.5–5)
PROT SERPL-MCNC: 6.6 G/DL — SIGNIFICANT CHANGE UP (ref 6–8)
PROT UR-MCNC: SIGNIFICANT CHANGE UP
PROTHROM AB SERPL-ACNC: 19.6 SEC — HIGH (ref 9.95–12.87)
RBC # BLD: 4.34 M/UL — LOW (ref 4.7–6.1)
RBC # FLD: 22.7 % — HIGH (ref 11.5–14.5)
RBC BLD AUTO: ABNORMAL
RBC CASTS # UR COMP ASSIST: 0 /HPF — SIGNIFICANT CHANGE UP (ref 0–4)
SODIUM SERPL-SCNC: 142 MMOL/L — SIGNIFICANT CHANGE UP (ref 135–146)
SODIUM SERPL-SCNC: 143 MMOL/L — SIGNIFICANT CHANGE UP (ref 135–146)
SP GR SPEC: 1.03 — HIGH (ref 1.01–1.02)
UROBILINOGEN FLD QL: ABNORMAL
VARIANT LYMPHS # BLD: 1.7 % — SIGNIFICANT CHANGE UP (ref 0–5)
WBC # BLD: 4.12 K/UL — LOW (ref 4.8–10.8)
WBC # FLD AUTO: 4.12 K/UL — LOW (ref 4.8–10.8)
WBC UR QL: 0 /HPF — SIGNIFICANT CHANGE UP (ref 0–5)

## 2020-02-21 PROCEDURE — 99221 1ST HOSP IP/OBS SF/LOW 40: CPT

## 2020-02-21 PROCEDURE — 71045 X-RAY EXAM CHEST 1 VIEW: CPT | Mod: 26

## 2020-02-21 PROCEDURE — 99233 SBSQ HOSP IP/OBS HIGH 50: CPT

## 2020-02-21 RX ORDER — POTASSIUM CHLORIDE 20 MEQ
20 PACKET (EA) ORAL ONCE
Refills: 0 | Status: COMPLETED | OUTPATIENT
Start: 2020-02-21 | End: 2020-02-21

## 2020-02-21 RX ORDER — MAGNESIUM SULFATE 500 MG/ML
2 VIAL (ML) INJECTION ONCE
Refills: 0 | Status: COMPLETED | OUTPATIENT
Start: 2020-02-21 | End: 2020-02-21

## 2020-02-21 RX ORDER — SODIUM CHLORIDE 9 MG/ML
500 INJECTION, SOLUTION INTRAVENOUS ONCE
Refills: 0 | Status: COMPLETED | OUTPATIENT
Start: 2020-02-21 | End: 2020-02-21

## 2020-02-21 RX ORDER — SODIUM CHLORIDE 9 MG/ML
1000 INJECTION, SOLUTION INTRAVENOUS ONCE
Refills: 0 | Status: COMPLETED | OUTPATIENT
Start: 2020-02-21 | End: 2020-02-21

## 2020-02-21 RX ORDER — POTASSIUM CHLORIDE 20 MEQ
40 PACKET (EA) ORAL ONCE
Refills: 0 | Status: DISCONTINUED | OUTPATIENT
Start: 2020-02-21 | End: 2020-02-21

## 2020-02-21 RX ADMIN — SODIUM CHLORIDE 1000 MILLILITER(S): 9 INJECTION, SOLUTION INTRAVENOUS at 11:06

## 2020-02-21 RX ADMIN — Medication 2 MILLIGRAM(S): at 18:11

## 2020-02-21 RX ADMIN — SODIUM CHLORIDE 500 MILLILITER(S): 9 INJECTION, SOLUTION INTRAVENOUS at 14:55

## 2020-02-21 RX ADMIN — PANTOPRAZOLE SODIUM 40 MILLIGRAM(S): 20 TABLET, DELAYED RELEASE ORAL at 18:11

## 2020-02-21 RX ADMIN — Medication 50 MILLIEQUIVALENT(S): at 11:14

## 2020-02-21 RX ADMIN — Medication 2 MILLIGRAM(S): at 11:54

## 2020-02-21 RX ADMIN — Medication 50 MILLIEQUIVALENT(S): at 09:44

## 2020-02-21 RX ADMIN — SODIUM CHLORIDE 500 MILLILITER(S): 9 INJECTION, SOLUTION INTRAVENOUS at 14:54

## 2020-02-21 RX ADMIN — Medication 2 MILLIGRAM(S): at 04:02

## 2020-02-21 RX ADMIN — Medication 2 MILLIGRAM(S): at 00:35

## 2020-02-21 RX ADMIN — Medication 50 MILLIEQUIVALENT(S): at 07:34

## 2020-02-21 RX ADMIN — Medication 50 GRAM(S): at 07:34

## 2020-02-21 RX ADMIN — Medication 2 MILLIGRAM(S): at 23:26

## 2020-02-21 RX ADMIN — SODIUM CHLORIDE 1000 MILLILITER(S): 9 INJECTION, SOLUTION INTRAVENOUS at 10:45

## 2020-02-21 RX ADMIN — SODIUM CHLORIDE 500 MILLILITER(S): 9 INJECTION, SOLUTION INTRAVENOUS at 11:28

## 2020-02-21 RX ADMIN — LACTULOSE 15 GRAM(S): 10 SOLUTION ORAL at 18:11

## 2020-02-21 RX ADMIN — Medication 50 GRAM(S): at 08:52

## 2020-02-21 RX ADMIN — Medication 2 MILLIGRAM(S): at 01:51

## 2020-02-21 RX ADMIN — PANTOPRAZOLE SODIUM 40 MILLIGRAM(S): 20 TABLET, DELAYED RELEASE ORAL at 06:22

## 2020-02-21 RX ADMIN — ENOXAPARIN SODIUM 40 MILLIGRAM(S): 100 INJECTION SUBCUTANEOUS at 11:27

## 2020-02-21 RX ADMIN — CHLORHEXIDINE GLUCONATE 1 APPLICATION(S): 213 SOLUTION TOPICAL at 06:22

## 2020-02-21 NOTE — PROGRESS NOTE ADULT - SUBJECTIVE AND OBJECTIVE BOX
Patient is a 39y old  Male who presents with a chief complaint of Alcohol abuse (20 Feb 2020 14:08)      Over Night Events:  Patient seen and examined now sleepy barely arousal precedex was stopped last ativan 4 am        ROS:  See HPI    PHYSICAL EXAM    ICU Vital Signs Last 24 Hrs  T(C): 36.2 (21 Feb 2020 08:00), Max: 37.8 (20 Feb 2020 16:25)  T(F): 97.2 (21 Feb 2020 08:00), Max: 100 (20 Feb 2020 16:25)  HR: 66 (21 Feb 2020 08:00) (66 - 152)  BP: 107/57 (21 Feb 2020 08:00) (99/49 - 169/93)  BP(mean): 74 (21 Feb 2020 08:00) (66 - 131)  ABP: --  ABP(mean): --  RR: 14 (21 Feb 2020 08:00) (13 - 32)  SpO2: 94% (21 Feb 2020 08:00) (92% - 98%)      General: barely open eyes on painful stimuli   HEENT: opal                Lymph Nodes: NO cervical LN   Lungs: Bilateral BS  Cardiovascular: Regular   Abdomen: Soft, Positive BS  Extremities: No clubbing   Skin: warm   Neurological: no focal deficit   Musculoskeletal: move all ext     I&O's Detail    20 Feb 2020 07:01  -  21 Feb 2020 07:00  --------------------------------------------------------  IN:    dexmedetomidine Infusion: 170.3 mL    IV PiggyBack: 150 mL    lactated ringers.: 1050 mL  Total IN: 1370.3 mL    OUT:  Total OUT: 0 mL    Total NET: 1370.3 mL      21 Feb 2020 07:01  -  21 Feb 2020 09:04  --------------------------------------------------------  IN:    IV PiggyBack: 50 mL    lactated ringers.: 75 mL  Total IN: 125 mL    OUT:  Total OUT: 0 mL    Total NET: 125 mL          LABS:                          10.7   4.12  )-----------( 77       ( 21 Feb 2020 05:00 )             33.1         21 Feb 2020 05:00    143    |  108    |  13     ----------------------------<  136    3.3     |  22     |  0.7      Ca    8.5        21 Feb 2020 05:00  Phos  4.7       21 Feb 2020 05:00  Mg     1.6       21 Feb 2020 05:00    TPro  6.6    /  Alb  3.0    /  TBili  2.9    /  DBili  x      /  AST  63     /  ALT  28     /  AlkPhos  94     21 Feb 2020 05:00  Amylase x     lipase x                                                 PT/INR - ( 21 Feb 2020 05:00 )   PT: 19.60 sec;   INR: 1.70 ratio         PTT - ( 21 Feb 2020 05:00 )  PTT:32.8 sec                                                                                                                                                                                        MEDICATIONS  (STANDING):  chlorhexidine 4% Liquid 1 Application(s) Topical <User Schedule>  dexMEDEtomidine Infusion 0.5 MICROgram(s)/kG/Hr (9.5 mL/Hr) IV Continuous <Continuous>  enoxaparin Injectable 40 milliGRAM(s) SubCutaneous daily  folic acid 1 milliGRAM(s) Oral daily  influenza   Vaccine 0.5 milliLiter(s) IntraMuscular once  lactated ringers. 1000 milliLiter(s) (75 mL/Hr) IV Continuous <Continuous>  lactulose Syrup 15 Gram(s) Oral two times a day  LORazepam   Injectable 2 milliGRAM(s) IV Push every 2 hours  multivitamin 1 Tablet(s) Oral daily  pantoprazole  Injectable 40 milliGRAM(s) IV Push two times a day  phytonadione   Solution 10 milliGRAM(s) Oral daily  potassium chloride  20 mEq/100 mL IVPB 20 milliEquivalent(s) IV Intermittent once  potassium chloride  20 mEq/100 mL IVPB 20 milliEquivalent(s) IV Intermittent once  predniSONE   Tablet 40 milliGRAM(s) Oral daily  thiamine 100 milliGRAM(s) Oral daily  thiamine Injectable 100 milliGRAM(s) IntraMuscular once    MEDICATIONS  (PRN):  LORazepam   Injectable 2 milliGRAM(s) IV Push every 30 minutes PRN Agitation          Xrays:  TLC:  OG:  ET tube:                                                                                       ECHO:  [facility] ICU:

## 2020-02-21 NOTE — PROGRESS NOTE ADULT - ASSESSMENT
39 year old male patient, homeless, active alcohol abuse, no other known past medical history presenting for epigastric pain and vomiting for the last 2 days.    # Abdominal pain/diarrhea/vomiting/elevated LFTS: Likely acute alcoholic hepatitis: improving  Alcoholic liver cirrhosis (alcohol, thrombocytopenia, elevated LFTS, AST/ALT ratio reversal, US abdomen showed nodular liver)  Mental status intact, Coagulopathy +  Lilie's score 0.016 likely patient has good prognosis on day # 3  CLD work up: HBC< HCV neg, H/O HAV infection, ceruloplasmin neg, iron studies neg for iron overload (ferritin normal), MARCUS, AMA, SMA, Anti HEV neg  Gamma Globulin slightly elevated    Rec:  Please trend liver enzymes, CMP, INR daily.  Monitor electrolytes, mental status.  c/w Prednisolone 40mg daily for alcoholic hepatitis (Day #5) and re-evaluation on Day 7 of Lilie's score    Alcohol abuse:  Ativan protocol for alcohol withdrawal.  Folate, MVI and thiamine  Alcohol cessation  care per ICU team    # Vomiting with blood tinge : Likely Aziza Chau vs PUD vs erosive esophagitis vs erosive gastritis/duodenitis   Hemodynamically stable  No active bleeding  Hg stable    Rec:  c/w protonix 40mg BID  Trend CBC daily  Give Vit K 10mg daily for 3 doses in total  Will need EGD once medically stable before discharge.      # Esophageal varices  needs screening EGD    #No ascites on US abdomen  No Known H/O HE    Coagulopathy:  Monitor INR daily.  c/w Vit K 10mg oral daily for 3 days.      # HCC screening:  AFP neg  CT abdomen with con: No lesions in liver for HCC  Repeat US abdomen and AFP in 6 months

## 2020-02-21 NOTE — PROGRESS NOTE ADULT - SUBJECTIVE AND OBJECTIVE BOX
Gastroenterology progress note:     Patient is a 39y old  Male who presents with a chief complaint of Alcohol abuse (21 Feb 2020 14:13)       Admitted on: 02-17-20    We are following the patient for alcoholic hepatitis     Interval History: patient got ativan overnight so he is drowsy. No BM or vomiting overnight      PAST MEDICAL & SURGICAL HISTORY:  Chronic alcohol abuse  No significant past surgical history        Allergies  Allergy Status Unknown        FH: No significant FH    Social history: NO IV drug abuse    MEDICATIONS  (STANDING):  chlorhexidine 4% Liquid 1 Application(s) Topical <User Schedule>  dexMEDEtomidine Infusion 0.5 MICROgram(s)/kG/Hr (9.5 mL/Hr) IV Continuous <Continuous>  enoxaparin Injectable 40 milliGRAM(s) SubCutaneous daily  folic acid 1 milliGRAM(s) Oral daily  influenza   Vaccine 0.5 milliLiter(s) IntraMuscular once  lactated ringers. 1000 milliLiter(s) (75 mL/Hr) IV Continuous <Continuous>  lactulose Syrup 15 Gram(s) Oral two times a day  LORazepam   Injectable 2 milliGRAM(s) IV Push every 4 hours  multivitamin 1 Tablet(s) Oral daily  pantoprazole  Injectable 40 milliGRAM(s) IV Push two times a day  phytonadione   Solution 10 milliGRAM(s) Oral daily  predniSONE   Tablet 40 milliGRAM(s) Oral daily  thiamine 100 milliGRAM(s) Oral daily  thiamine Injectable 100 milliGRAM(s) IntraMuscular once    MEDICATIONS  (PRN):  LORazepam   Injectable 2 milliGRAM(s) IV Push every 30 minutes PRN Agitation        Review of Systems: Unobtainable      Physical Examination:  T(C): 36.2 (02-21-20 @ 08:00), Max: 37.8 (02-20-20 @ 16:25)  HR: 58 (02-21-20 @ 14:00) (52 - 110)  BP: 115/66 (02-21-20 @ 14:00) (86/43 - 168/94)  RR: 14 (02-21-20 @ 14:00) (11 - 32)  SpO2: 98% (02-21-20 @ 14:00) (92% - 98%)  Weight (kg): 81.1 (02-20-20 @ 16:25)    02-20-20 @ 07:01  -  02-21-20 @ 07:00  --------------------------------------------------------  IN: 1370.3 mL / OUT: 0 mL / NET: 1370.3 mL    02-21-20 @ 07:01  -  02-21-20 @ 15:17  --------------------------------------------------------  IN: 3700 mL / OUT: 500 mL / NET: 3200 mL      Constitutional: Lying in bed  Neck: No thyromegaly or mass noted.  Respiratory:  No signs of respiratory distress. Lung sounds are clear bilaterally.  Cardiovascular:  S1 S2, Regular rate and rhythm.  Abdominal: Abdomen is soft, symmetric, and non-tender without distention. There are no visible lesions. Bowel sounds are present and normoactive in all four quadrants. No masses, hepatomegaly, or splenomegaly are noted.   Extremities: No pedal edema or cyanosis  Neuro: Drowsy          Data: (reviewed by attending)                        10.7   4.12  )-----------( 77       ( 21 Feb 2020 05:00 )             33.1     Hgb trend:  10.7  02-21-20 @ 05:00  11.5  02-19-20 @ 07:44        02-21    143  |  108  |  13  ----------------------------<  136<H>  3.3<L>   |  22  |  0.7    Ca    8.5      21 Feb 2020 05:00  Phos  4.7     02-21  Mg     1.6     02-21    TPro  6.6  /  Alb  3.0<L>  /  TBili  2.9<H>  /  DBili  x   /  AST  63<H>  /  ALT  28  /  AlkPhos  94  02-21    Liver panel trend:  TBili 2.9   /   AST 63   /   ALT 28   /   AlkP 94   /   Tptn 6.6   /   Alb 3.0    /   DBili --      02-21  TBili 2.7   /   AST 64   /   ALT 26   /   AlkP 110   /   Tptn 7.2   /   Alb 3.4    /   DBili --      02-19  TBili 2.9   /   AST 78   /   ALT 27   /   AlkP 124   /   Tptn 7.3   /   Alb 3.2    /   DBili 1.1      02-18  TBili 4.6   /   AST 74   /   ALT 25   /   AlkP 110   /   Tptn 7.1   /   Alb 3.2    /   DBili 1.5      02-17  TBili 4.7   /   AST 79   /   ALT 29   /   AlkP 132   /   Tptn 8.1   /   Alb 3.6    /   DBili --      02-16      PT/INR - ( 21 Feb 2020 05:00 )   PT: 19.60 sec;   INR: 1.70 ratio         PTT - ( 21 Feb 2020 05:00 )  PTT:32.8 sec       Radiology: (reviewed by attending)

## 2020-02-21 NOTE — CONSULT NOTE ADULT - SUBJECTIVE AND OBJECTIVE BOX
Neurology Consult    HPI obtained via chart review, as patient was sedated on exam and unable to participate in interview.      Patient was admitted for management of EtOH withdrawal, ativan taper and CIWA was initiated. CT and US showed findings consistent with cirrhosis. Patient INR was elevated. GI was consulted, plan was for endoscopy 2/20 to r/o varices. On 2/19, pt was alert and orientated in AM. Mental status deteriorated in 2/19 PM, pt was seen jumping on bed and sustained a fall. CT head for fall was done, was negative.     Patient continued to be disorientated in AM of 2/20. Did not respond to ativan 1mg, then 2mg, then haldol 5mg. ICU was contacted. Taper was changed to 2mg ativan Q2. Pulm fellow assessed patient, [facility] ICU care and patient was transferred.      As per chart review, patient received following dosing of Ativan:     2/17: 12mg total   2/18: 8mg total   2/19; 6mg total  2/20: 27.5 mg total     On exam today, patient did not exhibit any objective signs of withdrawal and was autonomically stable with temp, HR, BP within normal limits.  Patient was sedated, he was rousable to tactile stimulus however, stuporous and not agitated.      PAST MEDICAL & SURGICAL HISTORY:  Chronic alcohol abuse  No significant past surgical history      FAMILY HISTORY:  No pertinent family history in first degree relatives      Social History: (-) x 3    Allergies    Allergy Status Unknown    Intolerances        MEDICATIONS  (STANDING):  chlorhexidine 4% Liquid 1 Application(s) Topical <User Schedule>  dexMEDEtomidine Infusion 0.5 MICROgram(s)/kG/Hr (9.5 mL/Hr) IV Continuous <Continuous>  enoxaparin Injectable 40 milliGRAM(s) SubCutaneous daily  folic acid 1 milliGRAM(s) Oral daily  influenza   Vaccine 0.5 milliLiter(s) IntraMuscular once  lactated ringers. 1000 milliLiter(s) (75 mL/Hr) IV Continuous <Continuous>  lactulose Syrup 15 Gram(s) Oral two times a day  LORazepam   Injectable 2 milliGRAM(s) IV Push every 4 hours  multivitamin 1 Tablet(s) Oral daily  pantoprazole  Injectable 40 milliGRAM(s) IV Push two times a day  phytonadione   Solution 10 milliGRAM(s) Oral daily  predniSONE   Tablet 40 milliGRAM(s) Oral daily  thiamine 100 milliGRAM(s) Oral daily  thiamine Injectable 100 milliGRAM(s) IntraMuscular once    MEDICATIONS  (PRN):  LORazepam   Injectable 2 milliGRAM(s) IV Push every 30 minutes PRN Agitation      Unable to elicit ROS given patient's sedated status.    Vital Signs Last 24 Hrs  T(C): 36.2 (21 Feb 2020 08:00), Max: 37.8 (20 Feb 2020 16:25)  T(F): 97.2 (21 Feb 2020 08:00), Max: 100 (20 Feb 2020 16:25)  HR: 58 (21 Feb 2020 14:00) (52 - 110)  BP: 115/66 (21 Feb 2020 14:00) (86/43 - 168/94)  BP(mean): 86 (21 Feb 2020 14:00) (55 - 131)  RR: 14 (21 Feb 2020 14:00) (11 - 32)  SpO2: 98% (21 Feb 2020 14:00) (92% - 98%)    Neurologic Examination:  General:  +jaundice, + scleral icterus; patient opens eyes to vigorous tactile stimulus.  He is stuporous and unable to engage in interview.  Appearance is consistent with chronologic age.  No abnormal facies.   Cranial nerves: gaze is midline; CALLIE. No facial asymmetry.    Reflexes:   2+ b/l biceps, triceps, brachioradialis, patella     Labs:   CBC Full  -  ( 21 Feb 2020 05:00 )  WBC Count : 4.12 K/uL  RBC Count : 4.34 M/uL  Hemoglobin : 10.7 g/dL  Hematocrit : 33.1 %  Platelet Count - Automated : 77 K/uL  Mean Cell Volume : 76.3 fL  Mean Cell Hemoglobin : 24.7 pg  Mean Cell Hemoglobin Concentration : 32.3 g/dL  Auto Neutrophil # : 2.53 K/uL  Auto Lymphocyte # : 1.16 K/uL  Auto Monocyte # : 0.36 K/uL  Auto Eosinophil # : 0.00 K/uL  Auto Basophil # : 0.00 K/uL  Auto Neutrophil % : 61.4 %  Auto Lymphocyte % : 28.1 %  Auto Monocyte % : 8.8 %  Auto Eosinophil % : 0.0 %  Auto Basophil % : 0.0 %    02-21    143  |  108  |  13  ----------------------------<  136<H>  3.3<L>   |  22  |  0.7    Ca    8.5      21 Feb 2020 05:00  Phos  4.7     02-21  Mg     1.6     02-21    TPro  6.6  /  Alb  3.0<L>  /  TBili  2.9<H>  /  DBili  x   /  AST  63<H>  /  ALT  28  /  AlkPhos  94  02-21    LIVER FUNCTIONS - ( 21 Feb 2020 05:00 )  Alb: 3.0 g/dL / Pro: 6.6 g/dL / ALK PHOS: 94 U/L / ALT: 28 U/L / AST: 63 U/L / GGT: x           PT/INR - ( 21 Feb 2020 05:00 )   PT: 19.60 sec;   INR: 1.70 ratio         PTT - ( 21 Feb 2020 05:00 )  PTT:32.8 sec        Neuroimaging:  NCHCT:     < from: CT Head No Cont (02.19.20 @ 18:02) >    IMPRESSION:     No evidence of acute intracranial pathology.    < end of copied text >

## 2020-02-21 NOTE — CONSULT NOTE ADULT - ATTENDING COMMENTS
I have personally seen and examined this patient.  I have fully participated in the care of this patient.  I have reviewed all pertinent clinical information, including history, physical exam, plan and note.   I have reviewed all pertinent clinical information and reviewed all relevant imaging and diagnostic studies personally.  Patient admitted for DT. Received high dose Ativan for the past 24 hours. Currently sedated. Consider expanding Ativan intervals to q4hrs. REEG. Recommendations as above.  Agree with above assessment except as noted.
Patient seen and examined, agree with above, DT'S, alcoholic hepatitis, admit to MICU,  ativan protocol, if needed precedex

## 2020-02-21 NOTE — PROGRESS NOTE ADULT - SUBJECTIVE AND OBJECTIVE BOX
PATIENT:  NAZARIO WASHINGTON  [id number]    CHIEF COMPLAINT:  Patient is a 39y old  Male who presents with a chief complaint of Alcohol abuse (2020 09:03)      INTERVAL HISTORYOVERNIGHT EVENTS:  Patient was agitated last night, given precedex and ativan standing of 2 mg q2hrs. Patient then became lethargic, held ativan, and now patient awake. Patient speaks Turkish and complains of abdominal discomfort. Patient can follow commands, patient confused, oriented x 1.        MEDICATIONS:  MEDICATIONS  (STANDING):  chlorhexidine 4% Liquid 1 Application(s) Topical <User Schedule>  dexMEDEtomidine Infusion 0.5 MICROgram(s)/kG/Hr (9.5 mL/Hr) IV Continuous <Continuous>  enoxaparin Injectable 40 milliGRAM(s) SubCutaneous daily  folic acid 1 milliGRAM(s) Oral daily  influenza   Vaccine 0.5 milliLiter(s) IntraMuscular once  lactated ringers. 1000 milliLiter(s) (75 mL/Hr) IV Continuous <Continuous>  lactulose Syrup 15 Gram(s) Oral two times a day  LORazepam   Injectable 2 milliGRAM(s) IV Push every 4 hours  multivitamin 1 Tablet(s) Oral daily  pantoprazole  Injectable 40 milliGRAM(s) IV Push two times a day  phytonadione   Solution 10 milliGRAM(s) Oral daily  predniSONE   Tablet 40 milliGRAM(s) Oral daily  thiamine 100 milliGRAM(s) Oral daily  thiamine Injectable 100 milliGRAM(s) IntraMuscular once    MEDICATIONS  (PRN):  LORazepam   Injectable 2 milliGRAM(s) IV Push every 30 minutes PRN Agitation      ALLERGIES:  Allergies    Allergy Status Unknown    Intolerances        OBJECTIVE:  ICU Vital Signs Last 24 Hrs  T(C): 36.2 (2020 08:00), Max: 37.8 (2020 16:25)  T(F): 97.2 (2020 08:00), Max: 100 (2020 16:25)  HR: 52 (2020 13:00) (52 - 110)  BP: 100/54 (2020 13:00) (86/43 - 168/94)  BP(mean): 70 (2020 13:00) (55 - 131)  ABP: --  ABP(mean): --  RR: 11 (2020 13:00) (11 - 32)  SpO2: 96% (2020 13:00) (92% - 98%)      Adult Advanced Hemodynamics Last 24 Hrs  CVP(mm Hg): --  CVP(cm H2O): --  CO: --  CI: --  PA: --  PA(mean): --  PCWP: --  SVR: --  SVRI: --  PVR: --  PVRI: --  CAPILLARY BLOOD GLUCOSE        CAPILLARY BLOOD GLUCOSE        I&O's Summary    2020 07:01  -  2020 07:00  --------------------------------------------------------  IN: 1370.3 mL / OUT: 0 mL / NET: 1370.3 mL    2020 07:01  -  2020 14:14  --------------------------------------------------------  IN: 3200 mL / OUT: 500 mL / NET: 2700 mL      Daily Height in cm: 154.94 (2020 16:25)    Daily Weight in k.1 (2020 00:00)    PHYSICAL EXAMINATION:  General: NAD, patient sleepy, confused  HEENT: pupils pinpoint b/l and reactive, EOMI, moist mucous membranes  Neurology: A&Ox3, nonfocal, WADE x 4  Respiratory: CTA B/L, normal respiratory effort, no wheezes, crackles, rales  CV: RRR, S1S2, no murmurs, rubs or gallops  Abdominal: Soft, NT, ND +BS, Last BM  Extremities: No edema, + peripheral pulses  Incisions:   Tubes:    LABS:                          10.7   4.12  )-----------( 77       ( 2020 05:00 )             33.1     02-    143  |  108  |  13  ----------------------------<  136<H>  3.3<L>   |  22  |  0.7    Ca    8.5      2020 05:00  Phos  4.7       Mg     1.6         TPro  6.6  /  Alb  3.0<L>  /  TBili  2.9<H>  /  DBili  x   /  AST  63<H>  /  ALT  28  /  AlkPhos  94      LIVER FUNCTIONS - ( 2020 05:00 )  Alb: 3.0 g/dL / Pro: 6.6 g/dL / ALK PHOS: 94 U/L / ALT: 28 U/L / AST: 63 U/L / GGT: x           PT/INR - ( 2020 05:00 )   PT: 19.60 sec;   INR: 1.70 ratio         PTT - ( 2020 05:00 )  PTT:32.8 sec            TELEMETRY:     EKG:     IMAGING: PATIENT:  NAZARIO WASHINGTON  [id number]    CHIEF COMPLAINT:  Patient is a 39y old  Male who presents with a chief complaint of Alcohol abuse (2020 09:03)      INTERVAL HISTORYOVERNIGHT EVENTS:  Patient was agitated last night, given precedex and ativan standing of 2 mg q2hrs. Patient then became lethargic, held ativan, and now patient awake. Patient speaks Mexican and complains of abdominal discomfort. Patient can follow commands, patient confused, oriented x 1.        MEDICATIONS:  MEDICATIONS  (STANDING):  chlorhexidine 4% Liquid 1 Application(s) Topical <User Schedule>  dexMEDEtomidine Infusion 0.5 MICROgram(s)/kG/Hr (9.5 mL/Hr) IV Continuous <Continuous>  enoxaparin Injectable 40 milliGRAM(s) SubCutaneous daily  folic acid 1 milliGRAM(s) Oral daily  influenza   Vaccine 0.5 milliLiter(s) IntraMuscular once  lactated ringers. 1000 milliLiter(s) (75 mL/Hr) IV Continuous <Continuous>  lactulose Syrup 15 Gram(s) Oral two times a day  LORazepam   Injectable 2 milliGRAM(s) IV Push every 4 hours  multivitamin 1 Tablet(s) Oral daily  pantoprazole  Injectable 40 milliGRAM(s) IV Push two times a day  phytonadione   Solution 10 milliGRAM(s) Oral daily  predniSONE   Tablet 40 milliGRAM(s) Oral daily  thiamine 100 milliGRAM(s) Oral daily  thiamine Injectable 100 milliGRAM(s) IntraMuscular once    MEDICATIONS  (PRN):  LORazepam   Injectable 2 milliGRAM(s) IV Push every 30 minutes PRN Agitation      ALLERGIES:  Allergies    Allergy Status Unknown    Intolerances        OBJECTIVE:  ICU Vital Signs Last 24 Hrs  T(C): 36.2 (2020 08:00), Max: 37.8 (2020 16:25)  T(F): 97.2 (2020 08:00), Max: 100 (2020 16:25)  HR: 52 (2020 13:00) (52 - 110)  BP: 100/54 (2020 13:00) (86/43 - 168/94)  BP(mean): 70 (2020 13:00) (55 - 131)  ABP: --  ABP(mean): --  RR: 11 (2020 13:00) (11 - 32)  SpO2: 96% (2020 13:00) (92% - 98%)      Adult Advanced Hemodynamics Last 24 Hrs  CVP(mm Hg): --  CVP(cm H2O): --  CO: --  CI: --  PA: --  PA(mean): --  PCWP: --  SVR: --  SVRI: --  PVR: --  PVRI: --  CAPILLARY BLOOD GLUCOSE        CAPILLARY BLOOD GLUCOSE        I&O's Summary    2020 07:01  -  2020 07:00  --------------------------------------------------------  IN: 1370.3 mL / OUT: 0 mL / NET: 1370.3 mL    2020 07:01  -  2020 14:14  --------------------------------------------------------  IN: 3200 mL / OUT: 500 mL / NET: 2700 mL      Daily Height in cm: 154.94 (2020 16:25)    Daily Weight in k.1 (2020 00:00)    PHYSICAL EXAMINATION:  General: NAD, patient sleepy, confused  HEENT: pupils pinpoint b/l and reactive, EOMI, moist mucous membranes  Neurology: A&Ox1, nonfocal, WADE x 4 minimally, can follow commands, can answer questions in Mexican  Respiratory: CTA B/L, normal respiratory effort, no wheezes, crackles, rales  CV: RRR, S1S2, no murmurs, rubs or gallops  Abdominal: Soft, tender diffusely, ND +BS, Last BM  Extremities: No edema, + peripheral pulses    LABS:                          10.7   4.12  )-----------( 77       ( 2020 05:00 )             33.1     02-21    143  |  108  |  13  ----------------------------<  136<H>  3.3<L>   |  22  |  0.7    Ca    8.5      2020 05:00  Phos  4.7     -  Mg     1.6     -    TPro  6.6  /  Alb  3.0<L>  /  TBili  2.9<H>  /  DBili  x   /  AST  63<H>  /  ALT  28  /  AlkPhos  94  -    LIVER FUNCTIONS - ( 2020 05:00 )  Alb: 3.0 g/dL / Pro: 6.6 g/dL / ALK PHOS: 94 U/L / ALT: 28 U/L / AST: 63 U/L / GGT: x           PT/INR - ( 2020 05:00 )   PT: 19.60 sec;   INR: 1.70 ratio         PTT - ( 2020 05:00 )  PTT:32.8 sec            TELEMETRY:     EKG:     IMAGING: PATIENT:  NAZARIO WASHINGTON  [id number]    CHIEF COMPLAINT:  Patient is a 39y old  Male who presents with a chief complaint of Alcohol abuse (2020 09:03)      INTERVAL HISTORYOVERNIGHT EVENTS:  Patient was agitated last night, given precedex and ativan standing of 2 mg q2hrs. Patient then became lethargic, held ativan, and now patient awake. Patient speaks German and complains of abdominal discomfort. Patient can follow commands, patient confused, oriented x 1.        MEDICATIONS:  MEDICATIONS  (STANDING):  chlorhexidine 4% Liquid 1 Application(s) Topical <User Schedule>  dexMEDEtomidine Infusion 0.5 MICROgram(s)/kG/Hr (9.5 mL/Hr) IV Continuous <Continuous>  enoxaparin Injectable 40 milliGRAM(s) SubCutaneous daily  folic acid 1 milliGRAM(s) Oral daily  influenza   Vaccine 0.5 milliLiter(s) IntraMuscular once  lactated ringers. 1000 milliLiter(s) (75 mL/Hr) IV Continuous <Continuous>  lactulose Syrup 15 Gram(s) Oral two times a day  LORazepam   Injectable 2 milliGRAM(s) IV Push every 4 hours  multivitamin 1 Tablet(s) Oral daily  pantoprazole  Injectable 40 milliGRAM(s) IV Push two times a day  phytonadione   Solution 10 milliGRAM(s) Oral daily  predniSONE   Tablet 40 milliGRAM(s) Oral daily  thiamine 100 milliGRAM(s) Oral daily  thiamine Injectable 100 milliGRAM(s) IntraMuscular once    MEDICATIONS  (PRN):  LORazepam   Injectable 2 milliGRAM(s) IV Push every 30 minutes PRN Agitation      ALLERGIES:  Allergies    Allergy Status Unknown    Intolerances        OBJECTIVE:  ICU Vital Signs Last 24 Hrs  T(C): 36.2 (2020 08:00), Max: 37.8 (2020 16:25)  T(F): 97.2 (2020 08:00), Max: 100 (2020 16:25)  HR: 52 (2020 13:00) (52 - 110)  BP: 100/54 (2020 13:00) (86/43 - 168/94)  BP(mean): 70 (2020 13:00) (55 - 131)  ABP: --  ABP(mean): --  RR: 11 (2020 13:00) (11 - 32)  SpO2: 96% (2020 13:00) (92% - 98%)      Adult Advanced Hemodynamics Last 24 Hrs  CVP(mm Hg): --  CVP(cm H2O): --  CO: --  CI: --  PA: --  PA(mean): --  PCWP: --  SVR: --  SVRI: --  PVR: --  PVRI: --  CAPILLARY BLOOD GLUCOSE        CAPILLARY BLOOD GLUCOSE        I&O's Summary    2020 07:01  -  2020 07:00  --------------------------------------------------------  IN: 1370.3 mL / OUT: 0 mL / NET: 1370.3 mL    2020 07:01  -  2020 14:14  --------------------------------------------------------  IN: 3200 mL / OUT: 500 mL / NET: 2700 mL      Daily Height in cm: 154.94 (2020 16:25)    Daily Weight in k.1 (2020 00:00)    PHYSICAL EXAMINATION:  General: NAD, patient sleepy, confused  HEENT: pupils pinpoint b/l and reactive, EOMI, moist mucous membranes  Neurology: A&Ox1, nonfocal, WADE x 4 minimally, can follow commands, can answer questions in German  Respiratory: CTA B/L, normal respiratory effort, no wheezes, crackles, rales  CV: RRR, S1S2, no murmurs, rubs or gallops  Abdominal: Soft, tender diffusely, ND +BS, Last BM  Extremities: No edema, + peripheral pulses    LABS:                          10.7   4.12  )-----------( 77       ( 2020 05:00 )             33.1     02-21    143  |  108  |  13  ----------------------------<  136<H>  3.3<L>   |  22  |  0.7    Ca    8.5      2020 05:00  Phos  4.7     02-  Mg     1.6     -    TPro  6.6  /  Alb  3.0<L>  /  TBili  2.9<H>  /  DBili  x   /  AST  63<H>  /  ALT  28  /  AlkPhos  94  02-21    LIVER FUNCTIONS - ( 2020 05:00 )  Alb: 3.0 g/dL / Pro: 6.6 g/dL / ALK PHOS: 94 U/L / ALT: 28 U/L / AST: 63 U/L / GGT: x           PT/INR - ( 2020 05:00 )   PT: 19.60 sec;   INR: 1.70 ratio         PTT - ( 2020 05:00 )  PTT:32.8 sec            TELEMETRY:     EKG:     IMAGING:    EXAM:  XR CHEST PORTABLE ROUTINE 1V            PROCEDURE DATE:  2020            INTERPRETATION:  Clinical History/Reason for Exam:  ICU    Comparison: XR CHEST 2020.      Findings:    Technique/Positioning:  Frontal portable radiograph of the chest.    Support devices:  Leads overlie thorax obscuring anatomy    Cardiac/mediastinum/hilum: Stable    Lung parenchyma/ Pleura: No focal parenchymal opacities, effusion or pneumothorax is present.      Skeleton/soft tissues: No focal skeletal lesions are identified.      Impression:    No radiographic evidence of cardiopulmonary disease.\            Assessment:  27 yo male with PMH of obesity, had sleeve gastrectomy in  complicated by severe mal nutritional causing b/l lower extremity paralysis 6 months late to gastrectomy for upto 4 months which improved later with physical therapy and rehab. The patient presented with b/l LE pain 8/10, fluctuating, stabbing and electric shock like radiating form groins to feet. Patient usually walk with cane at baseline but from last 1 week his left knee started giving way and he fell.  In ED vitally stable and admitted for weakness, pain management and to rule out vitamin deficiency. (2020 02:34)    HOSPITAL COURSE: Patient was admitted for management of EtOH withdrawal, ativan taper and CIWAS was initiated. CT and US showed findings consistent with cirrhosis. Patient INR was elevated. GI was consulted, plan was for endoscopy  to r/o varices. On , pt was alert and orientated in AM. Mental status deteriorated in  PM, pt was seen jumping on bed and sustained a fall. CT head for fall was done, was negative.     REASON FOR UPGRADE: Patient disorientated in AM of . Did not respond to ativan 1mg, then 2mg, then haldol 5mg. ICU was contacted. Taper was changed to 2mg ativan Q2. Pulm fellow assessed patient, [facility] ICU care. Pt required several pushes of Ativan, received >10mg IV/IM since 6AM; prior to ICU upgrade.    2 EKGS were performed  AM as patient was tachycardic to 140s-150s. EKG showing sinus tachycardia times 2. Patient had low grade temperature of 99.    20: Patient was lethargic in the morning after getting precedex and ativan 2 mg q2hrs. Ativan held and precedex stopped. Patient woke up, but still sleepy. Patient confused, a&o x1, following commands, speaking German. Patient hypotensive, but fluid responsive.      Plan: PATIENT:  NAZARIO WASHINGTON  [id number]    CHIEF COMPLAINT:  Patient is a 39y old  Male who presents with a chief complaint of Alcohol abuse (2020 09:03)      INTERVAL HISTORYOVERNIGHT EVENTS:  Patient was agitated last night, given precedex and ativan standing of 2 mg q2hrs. Patient then became lethargic, held ativan, and now patient awake. Patient speaks E[place]rean and complains of abdominal discomfort. Patient can follow commands, patient confused, oriented x 1.        MEDICATIONS:  MEDICATIONS  (STANDING):  chlorhexidine 4% Liquid 1 Application(s) Topical <User Schedule>  dexMEDEtomidine Infusion 0.5 MICROgram(s)/kG/Hr (9.5 mL/Hr) IV Continuous <Continuous>  enoxaparin Injectable 40 milliGRAM(s) SubCutaneous daily  folic acid 1 milliGRAM(s) Oral daily  influenza   Vaccine 0.5 milliLiter(s) IntraMuscular once  lactated ringers. 1000 milliLiter(s) (75 mL/Hr) IV Continuous <Continuous>  lactulose Syrup 15 Gram(s) Oral two times a day  LORazepam   Injectable 2 milliGRAM(s) IV Push every 4 hours  multivitamin 1 Tablet(s) Oral daily  pantoprazole  Injectable 40 milliGRAM(s) IV Push two times a day  phytonadione   Solution 10 milliGRAM(s) Oral daily  predniSONE   Tablet 40 milliGRAM(s) Oral daily  thiamine 100 milliGRAM(s) Oral daily  thiamine Injectable 100 milliGRAM(s) IntraMuscular once    MEDICATIONS  (PRN):  LORazepam   Injectable 2 milliGRAM(s) IV Push every 30 minutes PRN Agitation      ALLERGIES:  Allergies    Allergy Status Unknown    Intolerances        OBJECTIVE:  ICU Vital Signs Last 24 Hrs  T(C): 36.2 (2020 08:00), Max: 37.8 (2020 16:25)  T(F): 97.2 (2020 08:00), Max: 100 (2020 16:25)  HR: 52 (2020 13:00) (52 - 110)  BP: 100/54 (2020 13:00) (86/43 - 168/94)  BP(mean): 70 (2020 13:00) (55 - 131)  ABP: --  ABP(mean): --  RR: 11 (2020 13:00) (11 - 32)  SpO2: 96% (2020 13:00) (92% - 98%)      Adult Advanced Hemodynamics Last 24 Hrs  CVP(mm Hg): --  CVP(cm H2O): --  CO: --  CI: --  PA: --  PA(mean): --  PCWP: --  SVR: --  SVRI: --  PVR: --  PVRI: --  CAPILLARY BLOOD GLUCOSE        CAPILLARY BLOOD GLUCOSE        I&O's Summary    2020 07:01  -  2020 07:00  --------------------------------------------------------  IN: 1370.3 mL / OUT: 0 mL / NET: 1370.3 mL    2020 07:01  -  2020 14:14  --------------------------------------------------------  IN: 3200 mL / OUT: 500 mL / NET: 2700 mL      Daily Height in cm: 154.94 (2020 16:25)    Daily Weight in k.1 (2020 00:00)    PHYSICAL EXAMINATION:  General: NAD, patient sleepy, confused  HEENT: pupils pinpoint b/l and reactive, EOMI, moist mucous membranes  Neurology: A&Ox1, nonfocal, WADE x 4 minimally, can follow commands, can answer questions in E[place]an  Respiratory: CTA B/L, normal respiratory effort, no wheezes, crackles, rales  CV: RRR, S1S2, no murmurs, rubs or gallops  Abdominal: Soft, tender diffusely, ND +BS, Last BM  Extremities: No edema, + peripheral pulses    LABS:                          10.7   4.12  )-----------( 77       ( 2020 05:00 )             33.1     02-21    143  |  108  |  13  ----------------------------<  136<H>  3.3<L>   |  22  |  0.7    Ca    8.5      2020 05:00  Phos  4.7     02-  Mg     1.6     -    TPro  6.6  /  Alb  3.0<L>  /  TBili  2.9<H>  /  DBili  x   /  AST  63<H>  /  ALT  28  /  AlkPhos  94  02-21    LIVER FUNCTIONS - ( 2020 05:00 )  Alb: 3.0 g/dL / Pro: 6.6 g/dL / ALK PHOS: 94 U/L / ALT: 28 U/L / AST: 63 U/L / GGT: x           PT/INR - ( 2020 05:00 )   PT: 19.60 sec;   INR: 1.70 ratio         PTT - ( 2020 05:00 )  PTT:32.8 sec            TELEMETRY:     EKG:     IMAGING:    EXAM:  XR CHEST PORTABLE ROUTINE 1V            PROCEDURE DATE:  2020            INTERPRETATION:  Clinical History/Reason for Exam:  ICU    Comparison: XR CHEST 2020.      Findings:    Technique/Positioning:  Frontal portable radiograph of the chest.    Support devices:  Leads overlie thorax obscuring anatomy    Cardiac/mediastinum/hilum: Stable    Lung parenchyma/ Pleura: No focal parenchymal opacities, effusion or pneumothorax is present.      Skeleton/soft tissues: No focal skeletal lesions are identified.      Impression:    No radiographic evidence of cardiopulmonary disease.\            Assessment:  27 yo male with PMH of obesity, had sleeve gastrectomy in  complicated by severe mal nutritional causing b/l lower extremity paralysis 6 months late to gastrectomy for upto 4 months which improved later with physical therapy and rehab. The patient presented with b/l LE pain 8/10, fluctuating, stabbing and electric shock like radiating form groins to feet. Patient usually walk with cane at baseline but from last 1 week his left knee started giving way and he fell.  In ED vitally stable and admitted for weakness, pain management and to rule out vitamin deficiency. (2020 02:34)    HOSPITAL COURSE: Patient was admitted for management of EtOH withdrawal, ativan taper and CIWAS was initiated. CT and US showed findings consistent with cirrhosis. Patient INR was elevated. GI was consulted, plan was for endoscopy  to r/o varices. On , pt was alert and orientated in AM. Mental status deteriorated in  PM, pt was seen jumping on bed and sustained a fall. CT head for fall was done, was negative.     REASON FOR UPGRADE: Patient disorientated in AM of . Did not respond to ativan 1mg, then 2mg, then haldol 5mg. ICU was contacted. Taper was changed to 2mg ativan Q2. Pulm fellow assessed patient, [facility] ICU care. Pt required several pushes of Ativan, received >10mg IV/IM since 6AM; prior to ICU upgrade.    2 EKGS were performed  AM as patient was tachycardic to 140s-150s. EKG showing sinus tachycardia times 2. Patient had low grade temperature of 99.    20: Patient was lethargic in the morning after getting precedex and ativan 2 mg q2hrs. Ativan held and precedex stopped. Patient woke up, but still sleepy. Patient confused, a&o x1, following commands, speaking E[place]an. Patient hypotensive, but fluid responsive.      Plan:    #Alcohol withdrawal, delirium tremens  - Patient starting to wake up, following commands, A&O x 1, minimal tremors  - Changed ativan to 2mg q4hrs with ativan 2 mg q30min PRN  - Precedex if needed  - CIWA monitoring  - MVI, folate, thiamine PATIENT:  NAZARIO WASHINGTON  [id number]    CHIEF COMPLAINT:  Patient is a 39y old  Male who presents with a chief complaint of Alcohol abuse (2020 09:03)      INTERVAL HISTORYOVERNIGHT EVENTS:  Patient was agitated last night, given precedex and ativan standing of 2 mg q2hrs. Patient then became lethargic, held ativan, and now patient awake. Patient speaks Kyrgyz and complains of abdominal discomfort. Patient can follow commands, patient confused, oriented x 1.        MEDICATIONS:  MEDICATIONS  (STANDING):  chlorhexidine 4% Liquid 1 Application(s) Topical <User Schedule>  dexMEDEtomidine Infusion 0.5 MICROgram(s)/kG/Hr (9.5 mL/Hr) IV Continuous <Continuous>  enoxaparin Injectable 40 milliGRAM(s) SubCutaneous daily  folic acid 1 milliGRAM(s) Oral daily  influenza   Vaccine 0.5 milliLiter(s) IntraMuscular once  lactated ringers. 1000 milliLiter(s) (75 mL/Hr) IV Continuous <Continuous>  lactulose Syrup 15 Gram(s) Oral two times a day  LORazepam   Injectable 2 milliGRAM(s) IV Push every 4 hours  multivitamin 1 Tablet(s) Oral daily  pantoprazole  Injectable 40 milliGRAM(s) IV Push two times a day  phytonadione   Solution 10 milliGRAM(s) Oral daily  predniSONE   Tablet 40 milliGRAM(s) Oral daily  thiamine 100 milliGRAM(s) Oral daily  thiamine Injectable 100 milliGRAM(s) IntraMuscular once    MEDICATIONS  (PRN):  LORazepam   Injectable 2 milliGRAM(s) IV Push every 30 minutes PRN Agitation      ALLERGIES:  Allergies    Allergy Status Unknown    Intolerances        OBJECTIVE:  ICU Vital Signs Last 24 Hrs  T(C): 36.2 (2020 08:00), Max: 37.8 (2020 16:25)  T(F): 97.2 (2020 08:00), Max: 100 (2020 16:25)  HR: 52 (2020 13:00) (52 - 110)  BP: 100/54 (2020 13:00) (86/43 - 168/94)  BP(mean): 70 (2020 13:00) (55 - 131)  ABP: --  ABP(mean): --  RR: 11 (2020 13:00) (11 - 32)  SpO2: 96% (2020 13:00) (92% - 98%)      Adult Advanced Hemodynamics Last 24 Hrs  CVP(mm Hg): --  CVP(cm H2O): --  CO: --  CI: --  PA: --  PA(mean): --  PCWP: --  SVR: --  SVRI: --  PVR: --  PVRI: --  CAPILLARY BLOOD GLUCOSE        CAPILLARY BLOOD GLUCOSE        I&O's Summary    2020 07:01  -  2020 07:00  --------------------------------------------------------  IN: 1370.3 mL / OUT: 0 mL / NET: 1370.3 mL    2020 07:01  -  2020 14:14  --------------------------------------------------------  IN: 3200 mL / OUT: 500 mL / NET: 2700 mL      Daily Height in cm: 154.94 (2020 16:25)    Daily Weight in k.1 (2020 00:00)    PHYSICAL EXAMINATION:  General: NAD, patient sleepy, confused  HEENT: pupils pinpoint b/l and reactive, EOMI, moist mucous membranes  Neurology: A&Ox1, nonfocal, WADE x 4 minimally, can follow commands, can answer questions in K[place]z  Respiratory: CTA B/L, normal respiratory effort, no wheezes, crackles, rales  CV: RRR, S1S2, no murmurs, rubs or gallops  Abdominal: Soft, tender diffusely, ND +BS  Extremities: No edema, + peripheral pulses    LABS:                          10.7   4.12  )-----------( 77       ( 2020 05:00 )             33.1     02-21    143  |  108  |  13  ----------------------------<  136<H>  3.3<L>   |  22  |  0.7    Ca    8.5      2020 05:00  Phos  4.7     02-  Mg     1.6     -    TPro  6.6  /  Alb  3.0<L>  /  TBili  2.9<H>  /  DBili  x   /  AST  63<H>  /  ALT  28  /  AlkPhos  94  -    LIVER FUNCTIONS - ( 2020 05:00 )  Alb: 3.0 g/dL / Pro: 6.6 g/dL / ALK PHOS: 94 U/L / ALT: 28 U/L / AST: 63 U/L / GGT: x           PT/INR - ( 2020 05:00 )   PT: 19.60 sec;   INR: 1.70 ratio         PTT - ( 2020 05:00 )  PTT:32.8 sec            TELEMETRY:     EKG:     IMAGING:    EXAM:  XR CHEST PORTABLE ROUTINE 1V            PROCEDURE DATE:  2020            INTERPRETATION:  Clinical History/Reason for Exam:  ICU    Comparison: XR CHEST 2020.      Findings:    Technique/Positioning:  Frontal portable radiograph of the chest.    Support devices:  Leads overlie thorax obscuring anatomy    Cardiac/mediastinum/hilum: Stable    Lung parenchyma/ Pleura: No focal parenchymal opacities, effusion or pneumothorax is present.      Skeleton/soft tissues: No focal skeletal lesions are identified.      Impression:    No radiographic evidence of cardiopulmonary disease.\            Assessment:  27 yo male with PMH of obesity, had sleeve gastrectomy in  complicated by severe mal nutritional causing b/l lower extremity paralysis 6 months late to gastrectomy for upto 4 months which improved later with physical therapy and rehab. The patient presented with b/l LE pain 8/10, fluctuating, stabbing and electric shock like radiating form groins to feet. Patient usually walk with cane at baseline but from last 1 week his left knee started giving way and he fell.  In ED vitally stable and admitted for weakness, pain management and to rule out vitamin deficiency. (2020 02:34)    HOSPITAL COURSE: Patient was admitted for management of EtOH withdrawal, ativan taper and CIWAS was initiated. CT and US showed findings consistent with cirrhosis. Patient INR was elevated. GI was consulted, plan was for endoscopy  to r/o varices. On , pt was alert and orientated in AM. Mental status deteriorated in  PM, pt was seen jumping on bed and sustained a fall. CT head for fall was done, was negative.     REASON FOR UPGRADE: Patient disorientated in AM of . Did not respond to ativan 1mg, then 2mg, then haldol 5mg. ICU was contacted. Taper was changed to 2mg ativan Q2. Pulm fellow assessed patient, [facility] ICU care. Pt required several pushes of Ativan, received >10mg IV/IM since 6AM; prior to ICU upgrade.    2 EKGS were performed  AM as patient was tachycardic to 140s-150s. EKG showing sinus tachycardia times 2. Patient had low grade temperature of 99.    2/: Patient was lethargic in the morning after getting precedex and ativan 2 mg q2hrs. Ativan held and precedex stopped. Patient woke up, but still sleepy. Patient confused, a&o x1, following commands, speaking [place]. Patient hypotensive, but fluid responsive.      Plan:    #Alcohol withdrawal, delirium tremens  - Patient starting to wake up, following commands, A&O x 1, minimal tremors  - Changed ativan to 2mg q4hrs with ativan 2 mg q30min PRN  - Precedex if needed  - CIWA monitoring  - MVI, folate, thiamine  - F/u routine EEG  - Neurology following PATIENT:  NAZARIO WASHINGTON  [id number]    CHIEF COMPLAINT:  Patient is a 39y old  Male who presents with a chief complaint of Alcohol abuse (2020 09:03)      INTERVAL HISTORYOVERNIGHT EVENTS:  Patient was agitated last night, given precedex and ativan standing of 2 mg q2hrs. Patient then became lethargic, held ativan, and now patient awake. Patient speaks [place] and complains of abdominal discomfort. Patient can follow commands, patient confused, oriented x 1.        MEDICATIONS:  MEDICATIONS  (STANDING):  chlorhexidine 4% Liquid 1 Application(s) Topical <User Schedule>  dexMEDEtomidine Infusion 0.5 MICROgram(s)/kG/Hr (9.5 mL/Hr) IV Continuous <Continuous>  enoxaparin Injectable 40 milliGRAM(s) SubCutaneous daily  folic acid 1 milliGRAM(s) Oral daily  influenza   Vaccine 0.5 milliLiter(s) IntraMuscular once  lactated ringers. 1000 milliLiter(s) (75 mL/Hr) IV Continuous <Continuous>  lactulose Syrup 15 Gram(s) Oral two times a day  LORazepam   Injectable 2 milliGRAM(s) IV Push every 4 hours  multivitamin 1 Tablet(s) Oral daily  pantoprazole  Injectable 40 milliGRAM(s) IV Push two times a day  phytonadione   Solution 10 milliGRAM(s) Oral daily  predniSONE   Tablet 40 milliGRAM(s) Oral daily  thiamine 100 milliGRAM(s) Oral daily  thiamine Injectable 100 milliGRAM(s) IntraMuscular once    MEDICATIONS  (PRN):  LORazepam   Injectable 2 milliGRAM(s) IV Push every 30 minutes PRN Agitation      ALLERGIES:  Allergies    Allergy Status Unknown    Intolerances        OBJECTIVE:  ICU Vital Signs Last 24 Hrs  T(C): 36.2 (2020 08:00), Max: 37.8 (2020 16:25)  T(F): 97.2 (2020 08:00), Max: 100 (2020 16:25)  HR: 52 (2020 13:00) (52 - 110)  BP: 100/54 (2020 13:00) (86/43 - 168/94)  BP(mean): 70 (2020 13:00) (55 - 131)  ABP: --  ABP(mean): --  RR: 11 (2020 13:00) (11 - 32)  SpO2: 96% (2020 13:00) (92% - 98%)      Adult Advanced Hemodynamics Last 24 Hrs  CVP(mm Hg): --  CVP(cm H2O): --  CO: --  CI: --  PA: --  PA(mean): --  PCWP: --  SVR: --  SVRI: --  PVR: --  PVRI: --  CAPILLARY BLOOD GLUCOSE        CAPILLARY BLOOD GLUCOSE        I&O's Summary    2020 07:01  -  2020 07:00  --------------------------------------------------------  IN: 1370.3 mL / OUT: 0 mL / NET: 1370.3 mL    2020 07:01  -  2020 14:14  --------------------------------------------------------  IN: 3200 mL / OUT: 500 mL / NET: 2700 mL      Daily Height in cm: 154.94 (2020 16:25)    Daily Weight in k.1 (2020 00:00)    PHYSICAL EXAMINATION:  General: NAD, patient sleepy, confused  HEENT: pupils pinpoint b/l and reactive, EOMI, moist mucous membranes  Neurology: A&Ox1, nonfocal, WADE x 4 minimally, can follow commands, can answer questions in [place]  Respiratory: CTA B/L, normal respiratory effort, no wheezes, crackles, rales  CV: RRR, S1S2, no murmurs, rubs or gallops  Abdominal: Soft, tender diffusely, ND +BS  Extremities: No edema, + peripheral pulses    LABS:                          10.7   4.12  )-----------( 77       ( 2020 05:00 )             33.1     02-21    143  |  108  |  13  ----------------------------<  136<H>  3.3<L>   |  22  |  0.7    Ca    8.5      2020 05:00  Phos  4.7     02-  Mg     1.6     -    TPro  6.6  /  Alb  3.0<L>  /  TBili  2.9<H>  /  DBili  x   /  AST  63<H>  /  ALT  28  /  AlkPhos  94  -    LIVER FUNCTIONS - ( 2020 05:00 )  Alb: 3.0 g/dL / Pro: 6.6 g/dL / ALK PHOS: 94 U/L / ALT: 28 U/L / AST: 63 U/L / GGT: x           PT/INR - ( 2020 05:00 )   PT: 19.60 sec;   INR: 1.70 ratio         PTT - ( 2020 05:00 )  PTT:32.8 sec            TELEMETRY:     EKG:     IMAGING:    EXAM:  XR CHEST PORTABLE ROUTINE 1V            PROCEDURE DATE:  2020            INTERPRETATION:  Clinical History/Reason for Exam:  ICU    Comparison: XR CHEST 2020.      Findings:    Technique/Positioning:  Frontal portable radiograph of the chest.    Support devices:  Leads overlie thorax obscuring anatomy    Cardiac/mediastinum/hilum: Stable    Lung parenchyma/ Pleura: No focal parenchymal opacities, effusion or pneumothorax is present.      Skeleton/soft tissues: No focal skeletal lesions are identified.      Impression:    No radiographic evidence of cardiopulmonary disease.\            Assessment:  25 yo male with PMH of obesity, had sleeve gastrectomy in  complicated by severe mal nutritional causing b/l lower extremity paralysis 6 months late to gastrectomy for upto 4 months which improved later with physical therapy and rehab. The patient presented with b/l LE pain 8/10, fluctuating, stabbing and electric shock like radiating form groins to feet. Patient usually walk with cane at baseline but from last 1 week his left knee started giving way and he fell.  In ED vitally stable and admitted for weakness, pain management and to rule out vitamin deficiency. (2020 02:34)    HOSPITAL COURSE: Patient was admitted for management of EtOH withdrawal, ativan taper and CIWAS was initiated. CT and US showed findings consistent with cirrhosis. Patient INR was elevated. GI was consulted, plan was for endoscopy  to r/o varices. On , pt was alert and orientated in AM. Mental status deteriorated in  PM, pt was seen jumping on bed and sustained a fall. CT head for fall was done, was negative.     REASON FOR UPGRADE: Patient disorientated in AM of . Did not respond to ativan 1mg, then 2mg, then haldol 5mg. ICU was contacted. Taper was changed to 2mg ativan Q2. Pulm fellow assessed patient, [facility] ICU care. Pt required several pushes of Ativan, received >10mg IV/IM since 6AM; prior to ICU upgrade.    2 EKGS were performed  AM as patient was tachycardic to 140s-150s. EKG showing sinus tachycardia times 2. Patient had low grade temperature of 99.    2/: Patient was lethargic in the morning after getting precedex and ativan 2 mg q2hrs. Ativan held and precedex stopped. Patient woke up, but still sleepy. Patient confused, a&o x1, following commands, speaking [place]. Patient hypotensive, but fluid responsive.      Plan:    #Alcohol withdrawal, delirium tremens  - Patient starting to wake up, following commands, A&O x 1, minimal tremors  - Changed ativan to 2mg q4hrs with ativan 2 mg q30min PRN  - Precedex if needed  - CIWA monitoring  - MVI, folate, thiamine  - F/u routine EEG  - Neurology following    #Alcohol hepatitis/liver cirrhosis  - CLD work up: HBC< HCV neg, H/O HAV infection, ceruloplasmin neg, iron studies neg for iron overload (ferritin normal), MARCUS, AMA, SMA, Anti HEV neg  - Prednisone 40 mg daily  - Needs EGD in future to check for varices  - Vitamin K 10 mg x 3 doses for coagulopathy  - Trend INR, LFTs  - Repeat US abdomen and AFP in 6 months  - GI following PATIENT:  NAZARIO WASHINGTON  [id number]    CHIEF COMPLAINT:  Patient is a 39y old  Male who presents with a chief complaint of Alcohol abuse (2020 09:03)      INTERVAL HISTORYOVERNIGHT EVENTS:  Patient was agitated last night, given precedex and ativan standing of 2 mg q2hrs. Patient then became lethargic, held ativan, and now patient awake. Patient speaks Citizen of [place] and complains of abdominal discomfort. Patient can follow commands, patient confused, oriented x 1.        MEDICATIONS:  MEDICATIONS  (STANDING):  chlorhexidine 4% Liquid 1 Application(s) Topical <User Schedule>  dexMEDEtomidine Infusion 0.5 MICROgram(s)/kG/Hr (9.5 mL/Hr) IV Continuous <Continuous>  enoxaparin Injectable 40 milliGRAM(s) SubCutaneous daily  folic acid 1 milliGRAM(s) Oral daily  influenza   Vaccine 0.5 milliLiter(s) IntraMuscular once  lactated ringers. 1000 milliLiter(s) (75 mL/Hr) IV Continuous <Continuous>  lactulose Syrup 15 Gram(s) Oral two times a day  LORazepam   Injectable 2 milliGRAM(s) IV Push every 4 hours  multivitamin 1 Tablet(s) Oral daily  pantoprazole  Injectable 40 milliGRAM(s) IV Push two times a day  phytonadione   Solution 10 milliGRAM(s) Oral daily  predniSONE   Tablet 40 milliGRAM(s) Oral daily  thiamine 100 milliGRAM(s) Oral daily  thiamine Injectable 100 milliGRAM(s) IntraMuscular once    MEDICATIONS  (PRN):  LORazepam   Injectable 2 milliGRAM(s) IV Push every 30 minutes PRN Agitation      ALLERGIES:  Allergies    Allergy Status Unknown    Intolerances        OBJECTIVE:  ICU Vital Signs Last 24 Hrs  T(C): 36.2 (2020 08:00), Max: 37.8 (2020 16:25)  T(F): 97.2 (2020 08:00), Max: 100 (2020 16:25)  HR: 52 (2020 13:00) (52 - 110)  BP: 100/54 (2020 13:00) (86/43 - 168/94)  BP(mean): 70 (2020 13:00) (55 - 131)  ABP: --  ABP(mean): --  RR: 11 (2020 13:00) (11 - 32)  SpO2: 96% (2020 13:00) (92% - 98%)      Adult Advanced Hemodynamics Last 24 Hrs  CVP(mm Hg): --  CVP(cm H2O): --  CO: --  CI: --  PA: --  PA(mean): --  PCWP: --  SVR: --  SVRI: --  PVR: --  PVRI: --  CAPILLARY BLOOD GLUCOSE        CAPILLARY BLOOD GLUCOSE        I&O's Summary    2020 07:01  -  2020 07:00  --------------------------------------------------------  IN: 1370.3 mL / OUT: 0 mL / NET: 1370.3 mL    2020 07:01  -  2020 14:14  --------------------------------------------------------  IN: 3200 mL / OUT: 500 mL / NET: 2700 mL      Daily Height in cm: 154.94 (2020 16:25)    Daily Weight in k.1 (2020 00:00)    PHYSICAL EXAMINATION:  General: NAD, patient sleepy, confused  HEENT: pupils pinpoint b/l and reactive, EOMI, moist mucous membranes  Neurology: A&Ox1, nonfocal, WADE x 4 minimally, can follow commands, can answer questions in Citizen of [place]  Respiratory: CTA B/L, normal respiratory effort, no wheezes, crackles, rales  CV: RRR, S1S2, no murmurs, rubs or gallops  Abdominal: Soft, tender diffusely, ND +BS  Extremities: No edema, + peripheral pulses    LABS:                          10.7   4.12  )-----------( 77       ( 2020 05:00 )             33.1     02-21    143  |  108  |  13  ----------------------------<  136<H>  3.3<L>   |  22  |  0.7    Ca    8.5      2020 05:00  Phos  4.7     02-  Mg     1.6     -    TPro  6.6  /  Alb  3.0<L>  /  TBili  2.9<H>  /  DBili  x   /  AST  63<H>  /  ALT  28  /  AlkPhos  94  -    LIVER FUNCTIONS - ( 2020 05:00 )  Alb: 3.0 g/dL / Pro: 6.6 g/dL / ALK PHOS: 94 U/L / ALT: 28 U/L / AST: 63 U/L / GGT: x           PT/INR - ( 2020 05:00 )   PT: 19.60 sec;   INR: 1.70 ratio         PTT - ( 2020 05:00 )  PTT:32.8 sec            TELEMETRY:     EKG:     IMAGING:    EXAM:  XR CHEST PORTABLE ROUTINE 1V            PROCEDURE DATE:  2020            INTERPRETATION:  Clinical History/Reason for Exam:  ICU    Comparison: XR CHEST 2020.      Findings:    Technique/Positioning:  Frontal portable radiograph of the chest.    Support devices:  Leads overlie thorax obscuring anatomy    Cardiac/mediastinum/hilum: Stable    Lung parenchyma/ Pleura: No focal parenchymal opacities, effusion or pneumothorax is present.      Skeleton/soft tissues: No focal skeletal lesions are identified.      Impression:    No radiographic evidence of cardiopulmonary disease.\            Assessment:  27 yo male with PMH of obesity, had sleeve gastrectomy in  complicated by severe mal nutritional causing b/l lower extremity paralysis 6 months late to gastrectomy for upto 4 months which improved later with physical therapy and rehab. The patient presented with b/l LE pain 8/10, fluctuating, stabbing and electric shock like radiating form groins to feet. Patient usually walk with cane at baseline but from last 1 week his left knee started giving way and he fell.  In ED vitally stable and admitted for weakness, pain management and to rule out vitamin deficiency. (2020 02:34)    HOSPITAL COURSE: Patient was admitted for management of EtOH withdrawal, ativan taper and CIWAS was initiated. CT and US showed findings consistent with cirrhosis. Patient INR was elevated. GI was consulted, plan was for endoscopy  to r/o varices. On , pt was alert and orientated in AM. Mental status deteriorated in  PM, pt was seen jumping on bed and sustained a fall. CT head for fall was done, was negative.     REASON FOR UPGRADE: Patient disorientated in AM of . Did not respond to ativan 1mg, then 2mg, then haldol 5mg. ICU was contacted. Taper was changed to 2mg ativan Q2. Pulm fellow assessed patient, [facility] ICU care. Pt required several pushes of Ativan, received >10mg IV/IM since 6AM; prior to ICU upgrade.    2 EKGS were performed  AM as patient was tachycardic to 140s-150s. EKG showing sinus tachycardia times 2. Patient had low grade temperature of 99.    2: Patient was lethargic in the morning after getting precedex and ativan 2 mg q2hrs. Ativan held and precedex stopped. Patient woke up, but still sleepy. Patient confused, a&o x1, following commands, speaking Citizen of [place]. Patient hypotensive, but fluid responsive.      Plan:    #Alcohol withdrawal, delirium tremens  - Patient starting to wake up, following commands, A&O x 1, minimal tremors  - Changed ativan to 2mg q4hrs with ativan 2 mg q30min PRN  - Precedex if needed  - CIWA monitoring  - MVI, folate, thiamine  - F/u routine EEG  - Neurology following    #Alcohol hepatitis/liver cirrhosis  - CLD work up: HBC< HCV neg, H/O HAV infection, ceruloplasmin neg, iron studies neg for iron overload (ferritin normal), MARCUS, AMA, SMA, Anti HEV neg  - Prednisone 40 mg daily  - Needs EGD in future to check for varices  - Vitamin K 10 mg x 3 doses for coagulopathy  - Trend INR, LFTs  - Repeat US abdomen and AFP in 6 months  - GI following    #Thrombocytopenia  - Most likely from alcohol abuse  - Platelets 77 from 78  - Trend CBC      DVT: Lovenox  GI: Protonix  Diet: DASH  Activity: As tolerated  Code: full

## 2020-02-21 NOTE — PROGRESS NOTE ADULT - ASSESSMENT
IMPRESSION:    Severe ETOH withdrawal/ DT'S  HO etoh dependance  ? alcoholic hepatitis  thrombocytopenia    PLAN:    CNS:  keep holding sedation now shelli ativan   stat abg   precedex if needed   HEENT: oral care    PULMONARY: HOB >30, aspiration precaution stat ABG assess CO2 might need intubation if elevated for airway protection   call  anesthesia for intubation if acidotic or elevated co2   CARDIOVASCULAR: IVF. Bp  echo, bolus 500 cc check cheetah and bolus as needed     GI: GI prophylaxis. NPO. GI f/up prednisone per GI check ammonia level     RENAL: fu lytes. correct as needed    INFECTIOUS DISEASE: pancx    HEMATOLOGICAL:  DVT prophylaxis.    ENDOCRINE:  Follow up FS.  Insulin protocol if needed.    Will need MICU monitoring for now IMPRESSION:    Severe ETOH withdrawal/ DT'S  HO etoh dependance  ? alcoholic hepatitis  thrombocytopenia    PLAN:    CNS:  keep holding sedation now shelli ativan   stat abg   precedex if needed   if did not wake ct brain     HEENT: oral care    PULMONARY: HOB >30, aspiration precaution stat ABG assess CO2 might need intubation if elevated for airway protection   call  anesthesia for intubation if acidotic or elevated co2   apply end tidal co2     CARDIOVASCULAR: IVF. Bp  echo, bolus 500 cc check cheetah and bolus as needed     GI: GI prophylaxis. NPO. GI f/up prednisone per GI check ammonia level     RENAL: fu lytes. correct as needed    INFECTIOUS DISEASE: pancx    HEMATOLOGICAL:  DVT prophylaxis.    ENDOCRINE:  Follow up FS.  Insulin protocol if needed.    Will need MICU monitoring for now

## 2020-02-21 NOTE — CONSULT NOTE ADULT - ASSESSMENT
Assessment:     Patient is a 39 y o  male initially admitted to medicine for abdominal pain and alcohol withdrawal with imaging findings consistent with cirrhosis.  Patient's stay was complicate by delirium tremens, for which he was upgrade to the ICU, where his withdrawal symptoms appear to have stabilized.      Plan  -rEEG   -patient's withdrawal symptoms appear to be well controlled on Ativan 2mg PO q2h dosing at this time;  once patient is stable on this dose for 24h, recommend considering lowering the dose or expanding the interval between doses so as to taper the stable total daily dose by 25% / 24h.   -continue MVI, Thiamine, Folate     attending note to follow.

## 2020-02-22 LAB
ALBUMIN SERPL ELPH-MCNC: 2.8 G/DL — LOW (ref 3.5–5.2)
ALP SERPL-CCNC: 87 U/L — SIGNIFICANT CHANGE UP (ref 30–115)
ALT FLD-CCNC: 29 U/L — SIGNIFICANT CHANGE UP (ref 0–41)
ANION GAP SERPL CALC-SCNC: 13 MMOL/L — SIGNIFICANT CHANGE UP (ref 7–14)
AST SERPL-CCNC: 63 U/L — HIGH (ref 0–41)
BILIRUB SERPL-MCNC: 2.3 MG/DL — HIGH (ref 0.2–1.2)
BUN SERPL-MCNC: 7 MG/DL — LOW (ref 10–20)
CALCIUM SERPL-MCNC: 8 MG/DL — LOW (ref 8.5–10.1)
CHLORIDE SERPL-SCNC: 105 MMOL/L — SIGNIFICANT CHANGE UP (ref 98–110)
CO2 SERPL-SCNC: 24 MMOL/L — SIGNIFICANT CHANGE UP (ref 17–32)
CREAT SERPL-MCNC: 0.6 MG/DL — LOW (ref 0.7–1.5)
GLUCOSE SERPL-MCNC: 86 MG/DL — SIGNIFICANT CHANGE UP (ref 70–99)
HCT VFR BLD CALC: 35.5 % — LOW (ref 42–52)
HGB BLD-MCNC: 11.2 G/DL — LOW (ref 14–18)
MAGNESIUM SERPL-MCNC: 1.5 MG/DL — LOW (ref 1.8–2.4)
MCHC RBC-ENTMCNC: 24.1 PG — LOW (ref 27–31)
MCHC RBC-ENTMCNC: 31.5 G/DL — LOW (ref 32–37)
MCV RBC AUTO: 76.5 FL — LOW (ref 80–94)
NRBC # BLD: 0 /100 WBCS — SIGNIFICANT CHANGE UP (ref 0–0)
PLATELET # BLD AUTO: 65 K/UL — LOW (ref 130–400)
POTASSIUM SERPL-MCNC: 3.6 MMOL/L — SIGNIFICANT CHANGE UP (ref 3.5–5)
POTASSIUM SERPL-SCNC: 3.6 MMOL/L — SIGNIFICANT CHANGE UP (ref 3.5–5)
PROT SERPL-MCNC: 6 G/DL — SIGNIFICANT CHANGE UP (ref 6–8)
RBC # BLD: 4.64 M/UL — LOW (ref 4.7–6.1)
RBC # FLD: 22.8 % — HIGH (ref 11.5–14.5)
SODIUM SERPL-SCNC: 142 MMOL/L — SIGNIFICANT CHANGE UP (ref 135–146)
WBC # BLD: 2.92 K/UL — LOW (ref 4.8–10.8)
WBC # FLD AUTO: 2.92 K/UL — LOW (ref 4.8–10.8)

## 2020-02-22 PROCEDURE — 71045 X-RAY EXAM CHEST 1 VIEW: CPT | Mod: 26

## 2020-02-22 PROCEDURE — 95819 EEG AWAKE AND ASLEEP: CPT | Mod: 26

## 2020-02-22 PROCEDURE — 93306 TTE W/DOPPLER COMPLETE: CPT | Mod: 26

## 2020-02-22 RX ORDER — MAGNESIUM SULFATE 500 MG/ML
2 VIAL (ML) INJECTION
Refills: 0 | Status: COMPLETED | OUTPATIENT
Start: 2020-02-22 | End: 2020-02-22

## 2020-02-22 RX ADMIN — Medication 10 MILLIGRAM(S): at 13:15

## 2020-02-22 RX ADMIN — CHLORHEXIDINE GLUCONATE 1 APPLICATION(S): 213 SOLUTION TOPICAL at 05:11

## 2020-02-22 RX ADMIN — Medication 2 MILLIGRAM(S): at 22:19

## 2020-02-22 RX ADMIN — Medication 50 GRAM(S): at 09:34

## 2020-02-22 RX ADMIN — Medication 40 MILLIGRAM(S): at 05:11

## 2020-02-22 RX ADMIN — Medication 100 MILLIGRAM(S): at 11:37

## 2020-02-22 RX ADMIN — Medication 2 MILLIGRAM(S): at 14:58

## 2020-02-22 RX ADMIN — Medication 1 MILLIGRAM(S): at 11:37

## 2020-02-22 RX ADMIN — LACTULOSE 15 GRAM(S): 10 SOLUTION ORAL at 05:11

## 2020-02-22 RX ADMIN — Medication 2 MILLIGRAM(S): at 11:04

## 2020-02-22 RX ADMIN — Medication 50 GRAM(S): at 10:17

## 2020-02-22 RX ADMIN — Medication 2 MILLIGRAM(S): at 17:14

## 2020-02-22 RX ADMIN — Medication 1 TABLET(S): at 11:37

## 2020-02-22 RX ADMIN — LACTULOSE 15 GRAM(S): 10 SOLUTION ORAL at 17:14

## 2020-02-22 RX ADMIN — PANTOPRAZOLE SODIUM 40 MILLIGRAM(S): 20 TABLET, DELAYED RELEASE ORAL at 05:11

## 2020-02-22 RX ADMIN — PANTOPRAZOLE SODIUM 40 MILLIGRAM(S): 20 TABLET, DELAYED RELEASE ORAL at 17:14

## 2020-02-22 RX ADMIN — ENOXAPARIN SODIUM 40 MILLIGRAM(S): 100 INJECTION SUBCUTANEOUS at 11:37

## 2020-02-22 NOTE — PROGRESS NOTE ADULT - ASSESSMENT
IMPRESSION:    Severe ETOH withdrawal/ DTs  HO EtOH dependance  ? 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 Monitor CIWA score closely    HEENT: oral care    PULMONARY: HOB >45    CARDIOVASCULAR: I=O    GI: GI prophylaxis. Continue prednisolone dose. F/U with GI    RENAL: fu lytes. correct as needed    INFECTIOUS DISEASE: pancx    HEMATOLOGICAL:  DVT prophylaxis.    ENDOCRINE:  Follow up FS.  Insulin protocol if needed.    Will need MICU monitoring for now

## 2020-02-22 NOTE — PROGRESS NOTE ADULT - SUBJECTIVE AND OBJECTIVE BOX
Patient is a 39y old  Male who presents with a chief complaint of Alcohol abuse (2020 15:16)    Over Night Events: none    ROS:     CONSTITUTIONAL:   no fever   no chills.  no weight gain   no weight loss    EYES:   no discharge,   no pain  no redness,   no visual changes.    ENT:   Ears: no ear pain and no hearing problems.  Nose: no nasal congestion and no nasal drainage.  Mouth/Throat: no dysphagia,  no hoarseness and no throat pain.  Neck: no lumps, no pain, no stiffness and no swollen glands.     CARDIOVASCULAR:   no chest pain,   no swelling  no palpitaions  no syncope    RESPIRATORY:  no SOB,  no wheezing ,  no respiratory difficulty  no sputum production    GASTROINTESTINAL:   no abdominal pain,   no constipation,   no diarrhea,   no vomiting.    GENITOURINARY:  no dysuria,   no frequency,   no urgency  no hematuria.    MUSCULOSKELETAL:   no back pain,   no musculoskeletal pain,  no weakness.    SKIN:   no jaundice,   no lesions,   no pruritis,   no rashes.    NEURO:   no loss of consciousness,   no gait abnormality,   no headache,   no sensory deficits,   no weakness.    PSYCHIATRIC:   no known mental health issues  no anxiety  no depression    ALLERGIC/IMMUNOLOGIC:   No active allergic or immunologic issues        PHYSICAL EXAM    ICU Vital Signs Last 24 Hrs  T(C): 36.8 (2020 09:00), Max: 36.8 (2020 20:00)  T(F): 98.2 (2020 09:00), Max: 98.3 (2020 20:00)  HR: 98 (2020 12:00) (48 - 98)  BP: 110/58 (2020 12:00) (90/43 - 137/83)  BP(mean): 75 (2020 12:00) (58 - 104)  RR: 22 (2020 12:00) (11 - 33)  SpO2: 98% (2020 12:00) (96% - 100%)      CONSTITUTIONAL:  Well nourished.  NAD    ENT:   Airway patent,   Mouth with normal mucosa.   No thrush    EYES:   Pupils equal,   Round and reactive to light.    CARDIAC:   Normal rate,   Regular rhythm.    No edema      Vascular:  Normal systolic impulse  No Carotid bruits    RESPIRATORY:   No wheezing  Bilateral BS  Normal chest expansion  Not tachypneic,  No use of accessory muscles    GASTROINTESTINAL:  Abdomen soft,   Non-tender,   No guarding,   + BS    GENITOURINARY  normal genitalia for sex  no edema    MUSCULOSKELETAL:   Range of motion is not limited,  No muscle or joint tenderness  No clubbing, cyanosis    NEUROLOGICAL:   Alert and oriented   No motor  deficits.  pertinent DTRs normal    SKIN:   Skin normal color for race,   Warm and dry and intact.   No evidence of rash.    PSYCHIATRIC:   Normal mood and affect.   No apparent risk to self or others.    HEME LYMPH:   No splenomegaly.  No cervical  lymphadenopathy.  no inguinal lymphadenopathy      20 @ 07:01  -  20 @ 07:00  --------------------------------------------------------  IN:    IV PiggyBack: 250 mL    Lactated Ringers IV Bolus: 3000 mL    lactated ringers.: 1800 mL    Oral Fluid: 200 mL  Total IN: 5250 mL    OUT:    Intermittent Catheterization - Urethral: 500 mL    Voided: 1750 mL  Total OUT: 2250 mL    Total NET: 3000 mL      20 @ 07:01  -  20 @ 13:27  --------------------------------------------------------  IN:  Total IN: 0 mL    OUT:    Voided: 500 mL  Total OUT: 500 mL    Total NET: -500 mL          LABS:                            11.2   2.92  )-----------( 65       ( 2020 05:09 )             35.5                                                   142  |  105  |  7<L>  ----------------------------<  86  3.6   |  24  |  0.6<L>    Ca    8.0<L>      2020 05:09  Phos  4.7       Mg     1.5         TPro  6.0  /  Alb  2.8<L>  /  TBili  2.3<H>  /  DBili  x   /  AST  63<H>  /  ALT  29  /  AlkPhos  87        PT/INR - ( 2020 05:00 )   PT: 19.60 sec;   INR: 1.70 ratio         PTT - ( 2020 05:00 )  PTT:32.8 sec                                       Urinalysis Basic - ( 2020 12:53 )    Color: Leonie / Appearance: Clear / S.035 / pH: x  Gluc: x / Ketone: Negative  / Bili: Small / Urobili: 12 mg/dL   Blood: x / Protein: Trace / Nitrite: Negative   Leuk Esterase: Negative / RBC: 0 /HPF / WBC 0 /HPF   Sq Epi: x / Non Sq Epi: 0 /HPF / Bacteria: Negative                                                  LIVER FUNCTIONS - ( 2020 05:09 )  Alb: 2.8 g/dL / Pro: 6.0 g/dL / ALK PHOS: 87 U/L / ALT: 29 U/L / AST: 63 U/L / GGT: x                                                                                                                                   ABG - ( 2020 09:19 )  pH, Arterial: 7.40  pH, Blood: x     /  pCO2: 40    /  pO2: 69    / HCO3: 25    / Base Excess: 0.5   /  SaO2: 93            MEDICATIONS  (STANDING):  chlorhexidine 4% Liquid 1 Application(s) Topical <User Schedule>  dexMEDEtomidine Infusion 0.5 MICROgram(s)/kG/Hr (9.5 mL/Hr) IV Continuous <Continuous>  enoxaparin Injectable 40 milliGRAM(s) SubCutaneous daily  folic acid 1 milliGRAM(s) Oral daily  influenza   Vaccine 0.5 milliLiter(s) IntraMuscular once  lactated ringers. 1000 milliLiter(s) (75 mL/Hr) IV Continuous <Continuous>  lactulose Syrup 15 Gram(s) Oral two times a day  LORazepam   Injectable 2 milliGRAM(s) IV Push every 4 hours  multivitamin 1 Tablet(s) Oral daily  pantoprazole  Injectable 40 milliGRAM(s) IV Push two times a day  predniSONE   Tablet 40 milliGRAM(s) Oral daily  thiamine Injectable 100 milliGRAM(s) IntraMuscular once    MEDICATIONS  (PRN):  LORazepam   Injectable 2 milliGRAM(s) IV Push every 1 hour PRN Agitation

## 2020-02-22 NOTE — CHART NOTE - NSCHARTNOTEFT_GEN_A_CORE
ICU Transfer Note        Transfer to: Medicine      HPI / ICU COURSE:  25 yo male with PMH of obesity, had sleeve gastrectomy in 2015 complicated by severe mal nutritional causing b/l lower extremity paralysis 6 months late to gastrectomy for upto 4 months which improved later with physical therapy and rehab. The patient presented with b/l LE pain 8/10, fluctuating, stabbing and electric shock like radiating form groins to feet. Patient usually walk with cane at baseline but from last 1 week his left knee started giving way and he fell.  In ED vitally stable and admitted for weakness, pain management and to rule out vitamin deficiency. (16 Feb 2020 02:34)    HOSPITAL COURSE: Patient was admitted for management of EtOH withdrawal, ativan taper and CIWAS was initiated. CT and US showed findings consistent with cirrhosis. Patient INR was elevated. GI was consulted, plan was for endoscopy 2/20 to r/o varices. On 2/19, pt was alert and orientated in AM. Mental status deteriorated in 2/19 PM, pt was seen jumping on bed and sustained a fall. CT head for fall was done, was negative.     REASON FOR UPGRADE: Patient disorientated in AM of 2/20. Did not respond to ativan 1mg, then 2mg, then haldol 5mg. ICU was contacted. Taper was changed to 2mg ativan Q2. Pulm fellow assessed patient, [facility] ICU care. Pt required several pushes of Ativan, received >10mg IV/IM since 6AM; prior to ICU upgrade.    2 EKGS were performed 2/20 AM as patient was tachycardic to 140s-150s. EKG showing sinus tachycardia times 2. Patient had low grade temperature of 99.    2/21/20: Patient was lethargic in the morning after getting precedex and ativan 2 mg q2hrs. Ativan held and precedex stopped. Patient woke up, but still sleepy. Patient confused, a&o x1, following commands, speaking Citizen of [place]. Patient hypotensive, but fluid responsive. Mental status progressively improved during the day. Pt was awake and eating by dinner. Overnight pt required minimal ativan. received 1 dose on 2/22 in the AM. CIWA 3-4.   pt stable for downgrade to the floor.          Vital Signs Last 24 Hrs  T(C): 36.8 (22 Feb 2020 09:00), Max: 36.8 (21 Feb 2020 20:00)  T(F): 98.2 (22 Feb 2020 09:00), Max: 98.3 (21 Feb 2020 20:00)  HR: 98 (22 Feb 2020 12:00) (48 - 98)  BP: 110/58 (22 Feb 2020 12:00) (90/43 - 137/83)  BP(mean): 75 (22 Feb 2020 12:00) (58 - 104)  RR: 22 (22 Feb 2020 12:00) (11 - 33)  SpO2: 98% (22 Feb 2020 12:00) (96% - 100%)    I&O's Summary    21 Feb 2020 07:01  -  22 Feb 2020 07:00  --------------------------------------------------------  IN: 5250 mL / OUT: 2250 mL / NET: 3000 mL    22 Feb 2020 07:01  -  22 Feb 2020 13:17  --------------------------------------------------------  IN: 0 mL / OUT: 500 mL / NET: -500 mL        Physical Exam:   General: NAD,   HEENT: yojana EOMI, moist mucous membranes  Neurology: A&Ox2, nonfocal, WADE x 4 minimally, can follow commands, can answer questions in Citizen of [place]  Respiratory: CTA B/L, normal respiratory effort, no wheezes, crackles, rales  CV: RRR, S1S2, no murmurs, rubs or gallops  Abdominal: Soft, tender diffusely, ND +BS  Extremities: No edema, + peripheral pulses    LABS:                               11.2   2.92  )-----------( 65       ( 22 Feb 2020 05:09 )             35.5       02-22    142  |  105  |  7<L>  ----------------------------<  86  3.6   |  24  |  0.6<L>    Ca    8.0<L>      22 Feb 2020 05:09  Phos  4.7     02-21  Mg     1.5     02-22    TPro  6.0  /  Alb  2.8<L>  /  TBili  2.3<H>  /  DBili  x   /  AST  63<H>  /  ALT  29  /  AlkPhos  87  02-22      PT/INR - ( 21 Feb 2020 05:00 )   PT: 19.60 sec;   INR: 1.70 ratio         PTT - ( 21 Feb 2020 05:00 )  PTT:32.8 sec    ABG - ( 21 Feb 2020 09:19 )  pH, Arterial: 7.40  pH, Blood: x     /  pCO2: 40    /  pO2: 69    / HCO3: 25    / Base Excess: 0.5   /  SaO2: 93                        ASSESSMENT & PLAN:     #Alcohol withdrawal, delirium tremens  - Changed ativan to 2mg q4hrs with ativan 2 mg q30min PRN  - c/w CIWA monitoring  - MVI, folate, thiamine  - routine EEG neg  - Neurology following  - CIWA score improved pt requiring less ativan stable for downgrade     #Alcohol hepatitis/liver cirrhosis  - CLD work up: HBC< HCV neg, H/O HAV infection, ceruloplasmin neg, iron studies neg for iron overload (ferritin normal), MARCUS, AMA, SMA, Anti HEV neg  - Prednisone 40 mg daily today is 6 of 7, however GI wanted prednisolone, clarify it pt still needs  - Needs EGD in future to check for varices  - Vitamin K 10 mg x 3 doses for coagulopathy  - Trend INR, LFTs  - Repeat US abdomen and AFP in 6 months  - GI following    #Thrombocytopenia  - Most likely from alcohol abuse  - Platelets 77 from 78  - Trend CBC      DVT: Lovenox  GI: Protonix  Diet: DASH  Activity: As tolerated  Code: full      FOR FOLLOW UP:  [ ] monitor CIWA   [ ] f/u GI for prednisolone   [ ]

## 2020-02-23 LAB
ALBUMIN SERPL ELPH-MCNC: 2.9 G/DL — LOW (ref 3.5–5.2)
ALP SERPL-CCNC: 117 U/L — HIGH (ref 30–115)
ALT FLD-CCNC: 32 U/L — SIGNIFICANT CHANGE UP (ref 0–41)
ANION GAP SERPL CALC-SCNC: 11 MMOL/L — SIGNIFICANT CHANGE UP (ref 7–14)
APTT BLD: 33.6 SEC — SIGNIFICANT CHANGE UP (ref 27–39.2)
AST SERPL-CCNC: 70 U/L — HIGH (ref 0–41)
BILIRUB DIRECT SERPL-MCNC: 0.9 MG/DL — HIGH (ref 0–0.2)
BILIRUB INDIRECT FLD-MCNC: 1.3 MG/DL — HIGH (ref 0.2–1.2)
BILIRUB SERPL-MCNC: 2.2 MG/DL — HIGH (ref 0.2–1.2)
BUN SERPL-MCNC: 7 MG/DL — LOW (ref 10–20)
CALCIUM SERPL-MCNC: 8.4 MG/DL — LOW (ref 8.5–10.1)
CHLORIDE SERPL-SCNC: 107 MMOL/L — SIGNIFICANT CHANGE UP (ref 98–110)
CO2 SERPL-SCNC: 23 MMOL/L — SIGNIFICANT CHANGE UP (ref 17–32)
CREAT SERPL-MCNC: 0.7 MG/DL — SIGNIFICANT CHANGE UP (ref 0.7–1.5)
GLUCOSE SERPL-MCNC: 101 MG/DL — HIGH (ref 70–99)
HCT VFR BLD CALC: 33.4 % — LOW (ref 42–52)
HGB BLD-MCNC: 10.6 G/DL — LOW (ref 14–18)
INR BLD: 1.71 RATIO — HIGH (ref 0.65–1.3)
MAGNESIUM SERPL-MCNC: 1.6 MG/DL — LOW (ref 1.8–2.4)
MCHC RBC-ENTMCNC: 24 PG — LOW (ref 27–31)
MCHC RBC-ENTMCNC: 31.7 G/DL — LOW (ref 32–37)
MCV RBC AUTO: 75.6 FL — LOW (ref 80–94)
NRBC # BLD: 0 /100 WBCS — SIGNIFICANT CHANGE UP (ref 0–0)
PLATELET # BLD AUTO: 68 K/UL — LOW (ref 130–400)
POTASSIUM SERPL-MCNC: 3.4 MMOL/L — LOW (ref 3.5–5)
POTASSIUM SERPL-SCNC: 3.4 MMOL/L — LOW (ref 3.5–5)
PROT SERPL-MCNC: 6.2 G/DL — SIGNIFICANT CHANGE UP (ref 6–8)
PROTHROM AB SERPL-ACNC: 19.7 SEC — HIGH (ref 9.95–12.87)
RBC # BLD: 4.42 M/UL — LOW (ref 4.7–6.1)
RBC # FLD: 22.4 % — HIGH (ref 11.5–14.5)
SODIUM SERPL-SCNC: 141 MMOL/L — SIGNIFICANT CHANGE UP (ref 135–146)
WBC # BLD: 3.2 K/UL — LOW (ref 4.8–10.8)
WBC # FLD AUTO: 3.2 K/UL — LOW (ref 4.8–10.8)

## 2020-02-23 PROCEDURE — 99233 SBSQ HOSP IP/OBS HIGH 50: CPT

## 2020-02-23 RX ORDER — MAGNESIUM SULFATE 500 MG/ML
2 VIAL (ML) INJECTION ONCE
Refills: 0 | Status: COMPLETED | OUTPATIENT
Start: 2020-02-23 | End: 2020-02-23

## 2020-02-23 RX ORDER — POTASSIUM CHLORIDE 20 MEQ
20 PACKET (EA) ORAL
Refills: 0 | Status: COMPLETED | OUTPATIENT
Start: 2020-02-23 | End: 2020-02-24

## 2020-02-23 RX ADMIN — Medication 2 MILLIGRAM(S): at 06:11

## 2020-02-23 RX ADMIN — Medication 40 MILLIGRAM(S): at 06:20

## 2020-02-23 RX ADMIN — Medication 2 MILLIGRAM(S): at 09:37

## 2020-02-23 RX ADMIN — Medication 2 MILLIGRAM(S): at 17:32

## 2020-02-23 RX ADMIN — Medication 50 MILLIEQUIVALENT(S): at 22:11

## 2020-02-23 RX ADMIN — LACTULOSE 15 GRAM(S): 10 SOLUTION ORAL at 17:33

## 2020-02-23 RX ADMIN — Medication 2 MILLIGRAM(S): at 01:42

## 2020-02-23 RX ADMIN — Medication 2 MILLIGRAM(S): at 13:31

## 2020-02-23 RX ADMIN — PANTOPRAZOLE SODIUM 40 MILLIGRAM(S): 20 TABLET, DELAYED RELEASE ORAL at 06:22

## 2020-02-23 RX ADMIN — Medication 50 GRAM(S): at 22:11

## 2020-02-23 RX ADMIN — Medication 1 MILLIGRAM(S): at 11:07

## 2020-02-23 RX ADMIN — PANTOPRAZOLE SODIUM 40 MILLIGRAM(S): 20 TABLET, DELAYED RELEASE ORAL at 17:33

## 2020-02-23 RX ADMIN — ENOXAPARIN SODIUM 40 MILLIGRAM(S): 100 INJECTION SUBCUTANEOUS at 11:07

## 2020-02-23 RX ADMIN — Medication 2 MILLIGRAM(S): at 22:12

## 2020-02-23 RX ADMIN — LACTULOSE 15 GRAM(S): 10 SOLUTION ORAL at 06:20

## 2020-02-23 RX ADMIN — Medication 1 TABLET(S): at 11:07

## 2020-02-23 NOTE — PROGRESS NOTE ADULT - SUBJECTIVE AND OBJECTIVE BOX
NAZARIO WASHINGTON  39y  Male      Patient is a 39y old  Male who presents with a chief complaint of Alcohol abuse (2020 13:27)      INTERVAL HPI/OVERNIGHT EVENTS:      ******************************* REVIEW OF SYSTEMS:**********************************************  resting in bed, comfortable.   All other review of systems negative    *********************** VITALS ******************************************    T(F): 96.5 (20 @ 05:44)  HR: 82 (20 @ 05:44) (82 - 113)  BP: 144/82 (20 @ 05:44) (110/58 - 144/82)  RR: 20 (20 @ 05:44) (20 - 24)  SpO2: 98% (20 @ 07:36) (96% - 98%)    20 @ 07:01  -  20 @ 07:00  --------------------------------------------------------  IN: 975 mL / OUT: 1000 mL / NET: -25 mL            20 @ 07:01  -  20 @ 07:00  --------------------------------------------------------  IN: 975 mL / OUT: 1000 mL / NET: -25 mL        ******************************** PHYSICAL EXAM:**************************************************  GENERAL: NAD    PSYCH: no agitation, baseline mentation  HEENT: Still  icteric.     NERVOUS SYSTEM:  Alert & Oriented X3,   PULMONARY: EDGAR, CTA    CARDIOVASCULAR: S1S2 RRR    GI: Soft, NT, ND; BS present.    EXTREMITIES:  2+ Peripheral Pulses, No clubbing, cyanosis, or edema    LYMPH: No lymphadenopathy noted    SKIN: No rashes or lesions    ******************************************************************************************      **************************** LABS *******************************************************                          10.6   3.20  )-----------( 68       ( 2020 06:34 )             33.4         141  |  107  |  7<L>  ----------------------------<  101<H>  3.4<L>   |  23  |  0.7    Ca    8.4<L>      2020 06:34  Mg     1.6         TPro  6.2  /  Alb  2.9<L>  /  TBili  2.2<H>  /  DBili  0.9<H>  /  AST  70<H>  /  ALT  32  /  AlkPhos  117<H>        Urinalysis Basic - ( 2020 12:53 )    Color: Leonie / Appearance: Clear / S.035 / pH: x  Gluc: x / Ketone: Negative  / Bili: Small / Urobili: 12 mg/dL   Blood: x / Protein: Trace / Nitrite: Negative   Leuk Esterase: Negative / RBC: 0 /HPF / WBC 0 /HPF   Sq Epi: x / Non Sq Epi: 0 /HPF / Bacteria: Negative      PT/INR - ( 2020 06:34 )   PT: 19.70 sec;   INR: 1.71 ratio         PTT - ( 2020 06:34 )  PTT:33.6 sec  Lactate Trend        CAPILLARY BLOOD GLUCOSE              **************************Active Medications *******************************************  Allergy Status Unknown      chlorhexidine 4% Liquid 1 Application(s) Topical <User Schedule>  dexMEDEtomidine Infusion 0.5 MICROgram(s)/kG/Hr IV Continuous <Continuous>  enoxaparin Injectable 40 milliGRAM(s) SubCutaneous daily  folic acid 1 milliGRAM(s) Oral daily  influenza   Vaccine 0.5 milliLiter(s) IntraMuscular once  lactated ringers. 1000 milliLiter(s) IV Continuous <Continuous>  lactulose Syrup 15 Gram(s) Oral two times a day  LORazepam   Injectable 2 milliGRAM(s) IV Push every 4 hours  LORazepam   Injectable 2 milliGRAM(s) IV Push every 1 hour PRN  multivitamin 1 Tablet(s) Oral daily  pantoprazole  Injectable 40 milliGRAM(s) IV Push two times a day  predniSONE   Tablet 40 milliGRAM(s) Oral daily  thiamine Injectable 100 milliGRAM(s) IntraMuscular once      ***************************************************  RADIOLOGY & ADDITIONAL TESTS:    Imaging Personally Reviewed:  [ ] YES  [ ] NO    HEALTH ISSUES - PROBLEM Dx:

## 2020-02-23 NOTE — PROGRESS NOTE ADULT - ASSESSMENT
39 year old male patient, homeless, active alcohol abuse, no other known past medical history presenting for epigastric pain and vomiting for the last 2 days.    # Likely acute alcoholic hepatitis: improving  Alcoholic liver cirrhosis   CLD work up: HBC< HCV neg, H/O HAV infection, ceruloplasmin neg, iron studies neg for iron overload (ferritin normal),   MARCUS, AMA, SMA, Anti HEV neg  Gamma Globulin slightly elevated    # Severe ETOH withdrawal/ DTs    on Ativan /CIWA protocol     # Possible superior mediastinal mass seen on CXR    # Suspected thiamine / folate  def >> on supplement.  # Hypokalemia / hypomagnesemia >> will replete it.      Needs outpt w/u.    DVT/GI PPX.    Will need PT/OT  Social service eval.

## 2020-02-24 LAB
ALBUMIN SERPL ELPH-MCNC: 3.1 G/DL — LOW (ref 3.5–5.2)
ALP SERPL-CCNC: 98 U/L — SIGNIFICANT CHANGE UP (ref 30–115)
ALT FLD-CCNC: 38 U/L — SIGNIFICANT CHANGE UP (ref 0–41)
ANION GAP SERPL CALC-SCNC: 13 MMOL/L — SIGNIFICANT CHANGE UP (ref 7–14)
APTT BLD: 32.5 SEC — SIGNIFICANT CHANGE UP (ref 27–39.2)
AST SERPL-CCNC: 90 U/L — HIGH (ref 0–41)
BILIRUB DIRECT SERPL-MCNC: 1 MG/DL — HIGH (ref 0–0.2)
BILIRUB INDIRECT FLD-MCNC: 1.3 MG/DL — HIGH (ref 0.2–1.2)
BILIRUB SERPL-MCNC: 2.3 MG/DL — HIGH (ref 0.2–1.2)
BUN SERPL-MCNC: 5 MG/DL — LOW (ref 10–20)
CALCIUM SERPL-MCNC: 8.4 MG/DL — LOW (ref 8.5–10.1)
CHLORIDE SERPL-SCNC: 106 MMOL/L — SIGNIFICANT CHANGE UP (ref 98–110)
CO2 SERPL-SCNC: 23 MMOL/L — SIGNIFICANT CHANGE UP (ref 17–32)
CREAT SERPL-MCNC: 0.6 MG/DL — LOW (ref 0.7–1.5)
GLUCOSE SERPL-MCNC: 93 MG/DL — SIGNIFICANT CHANGE UP (ref 70–99)
HCT VFR BLD CALC: 35.5 % — LOW (ref 42–52)
HGB BLD-MCNC: 11.4 G/DL — LOW (ref 14–18)
INR BLD: 1.57 RATIO — HIGH (ref 0.65–1.3)
MAGNESIUM SERPL-MCNC: 1.7 MG/DL — LOW (ref 1.8–2.4)
MCHC RBC-ENTMCNC: 24.5 PG — LOW (ref 27–31)
MCHC RBC-ENTMCNC: 32.1 G/DL — SIGNIFICANT CHANGE UP (ref 32–37)
MCV RBC AUTO: 76.2 FL — LOW (ref 80–94)
NRBC # BLD: 0 /100 WBCS — SIGNIFICANT CHANGE UP (ref 0–0)
PLATELET # BLD AUTO: 86 K/UL — LOW (ref 130–400)
POTASSIUM SERPL-MCNC: 3.6 MMOL/L — SIGNIFICANT CHANGE UP (ref 3.5–5)
POTASSIUM SERPL-SCNC: 3.6 MMOL/L — SIGNIFICANT CHANGE UP (ref 3.5–5)
PROT SERPL-MCNC: 6.5 G/DL — SIGNIFICANT CHANGE UP (ref 6–8)
PROTHROM AB SERPL-ACNC: 18.1 SEC — HIGH (ref 9.95–12.87)
RBC # BLD: 4.66 M/UL — LOW (ref 4.7–6.1)
RBC # FLD: 22.5 % — HIGH (ref 11.5–14.5)
SODIUM SERPL-SCNC: 142 MMOL/L — SIGNIFICANT CHANGE UP (ref 135–146)
WBC # BLD: 3.78 K/UL — LOW (ref 4.8–10.8)
WBC # FLD AUTO: 3.78 K/UL — LOW (ref 4.8–10.8)

## 2020-02-24 PROCEDURE — 99233 SBSQ HOSP IP/OBS HIGH 50: CPT

## 2020-02-24 PROCEDURE — 99232 SBSQ HOSP IP/OBS MODERATE 35: CPT | Mod: GC

## 2020-02-24 RX ORDER — THIAMINE MONONITRATE (VIT B1) 100 MG
1 TABLET ORAL
Qty: 0 | Refills: 0 | DISCHARGE
Start: 2020-02-24

## 2020-02-24 RX ORDER — FOLIC ACID 0.8 MG
1 TABLET ORAL
Qty: 0 | Refills: 0 | DISCHARGE
Start: 2020-02-24

## 2020-02-24 RX ORDER — PANTOPRAZOLE SODIUM 20 MG/1
40 TABLET, DELAYED RELEASE ORAL
Qty: 0 | Refills: 0 | DISCHARGE
Start: 2020-02-24

## 2020-02-24 RX ORDER — THIAMINE MONONITRATE (VIT B1) 100 MG
100 TABLET ORAL DAILY
Refills: 0 | Status: DISCONTINUED | OUTPATIENT
Start: 2020-02-24 | End: 2020-02-25

## 2020-02-24 RX ORDER — MAGNESIUM OXIDE 400 MG ORAL TABLET 241.3 MG
1 TABLET ORAL
Qty: 0 | Refills: 0 | DISCHARGE
Start: 2020-02-24

## 2020-02-24 RX ORDER — MAGNESIUM OXIDE 400 MG ORAL TABLET 241.3 MG
400 TABLET ORAL
Refills: 0 | Status: DISCONTINUED | OUTPATIENT
Start: 2020-02-24 | End: 2020-02-25

## 2020-02-24 RX ADMIN — SODIUM CHLORIDE 75 MILLILITER(S): 9 INJECTION, SOLUTION INTRAVENOUS at 15:25

## 2020-02-24 RX ADMIN — Medication 1 MILLIGRAM(S): at 11:33

## 2020-02-24 RX ADMIN — MAGNESIUM OXIDE 400 MG ORAL TABLET 400 MILLIGRAM(S): 241.3 TABLET ORAL at 17:05

## 2020-02-24 RX ADMIN — PANTOPRAZOLE SODIUM 40 MILLIGRAM(S): 20 TABLET, DELAYED RELEASE ORAL at 17:08

## 2020-02-24 RX ADMIN — Medication 50 MILLIEQUIVALENT(S): at 01:29

## 2020-02-24 RX ADMIN — LACTULOSE 15 GRAM(S): 10 SOLUTION ORAL at 17:04

## 2020-02-24 RX ADMIN — Medication 1 TABLET(S): at 11:34

## 2020-02-24 RX ADMIN — Medication 100 MILLIGRAM(S): at 14:56

## 2020-02-24 RX ADMIN — Medication 50 MILLIEQUIVALENT(S): at 03:36

## 2020-02-24 RX ADMIN — Medication 2 MILLIGRAM(S): at 05:48

## 2020-02-24 RX ADMIN — PANTOPRAZOLE SODIUM 40 MILLIGRAM(S): 20 TABLET, DELAYED RELEASE ORAL at 05:48

## 2020-02-24 RX ADMIN — Medication 2 MILLIGRAM(S): at 01:45

## 2020-02-24 RX ADMIN — LACTULOSE 15 GRAM(S): 10 SOLUTION ORAL at 05:48

## 2020-02-24 RX ADMIN — CHLORHEXIDINE GLUCONATE 1 APPLICATION(S): 213 SOLUTION TOPICAL at 05:47

## 2020-02-24 RX ADMIN — Medication 40 MILLIGRAM(S): at 05:47

## 2020-02-24 RX ADMIN — MAGNESIUM OXIDE 400 MG ORAL TABLET 400 MILLIGRAM(S): 241.3 TABLET ORAL at 11:34

## 2020-02-24 NOTE — PROGRESS NOTE ADULT - SUBJECTIVE AND OBJECTIVE BOX
Gastroenterology progress note:     Patient is a 39y old  Male who presents with a chief complaint of Alcohol abuse (24 Feb 2020 11:33)       Admitted on: 02-17-20    We are following the patient for:     Interval History: no acute event  overnight , patient denies any hematemesis today , no nausea or vomiting, having bowel movements and is yellow in color last night , normal mentation , no asterixis , LFTs did not show significant change from yesterday , but improved since admission     Patient's medical problems are improving        PAST MEDICAL & SURGICAL HISTORY:  Chronic alcohol abuse  No significant past surgical history      MEDICATIONS  (STANDING):  chlorhexidine 4% Liquid 1 Application(s) Topical <User Schedule>  folic acid 1 milliGRAM(s) Oral daily  influenza   Vaccine 0.5 milliLiter(s) IntraMuscular once  lactated ringers. 1000 milliLiter(s) (75 mL/Hr) IV Continuous <Continuous>  lactulose Syrup 15 Gram(s) Oral two times a day  magnesium oxide 400 milliGRAM(s) Oral three times a day with meals  multivitamin 1 Tablet(s) Oral daily  pantoprazole  Injectable 40 milliGRAM(s) IV Push two times a day  predniSONE   Tablet 40 milliGRAM(s) Oral daily  thiamine 100 milliGRAM(s) Oral daily  thiamine Injectable 100 milliGRAM(s) IntraMuscular once    MEDICATIONS  (PRN):  LORazepam   Injectable 2 milliGRAM(s) IV Push every 4 hours PRN Agitation      Allergies  Allergy Status Unknown      Review of Systems:   Cardiovascular:  No Chest Pain, No Palpitations  Respiratory:  No Cough, No Dyspnea  Gastrointestinal:  As described in HPI    Physical Examination:  T(C): 36.6 (02-24-20 @ 05:39), Max: 36.8 (02-23-20 @ 12:56)  HR: 73 (02-24-20 @ 05:39) (73 - 102)  BP: 148/92 (02-24-20 @ 05:39) (145/75 - 159/86)  RR: 18 (02-24-20 @ 05:39) (18 - 18)  SpO2: --      Constitutional: No acute distress.  Respiratory:  No signs of respiratory distress. Lung sounds are clear bilaterally.  Cardiovascular:  S1 S2, Regular rate and rhythm.  Abdominal: Abdomen is soft, symmetric, and non-tender without distention. There are no visible lesions or scars. Bowel sounds are present and normoactive in all four quadrants. No masses, hepatomegaly, or splenomegaly are noted.   Skin: No rashes, No Jaundice.        Data: (reviewed by attending)                        11.4   3.78  )-----------( 86       ( 24 Feb 2020 05:50 )             35.5     Hgb trend:  11.4  02-24-20 @ 05:50  10.6  02-23-20 @ 06:34  11.2  02-22-20 @ 05:09        02-24    142  |  106  |  5<L>  ----------------------------<  93  3.6   |  23  |  0.6<L>    Ca    8.4<L>      24 Feb 2020 05:50  Mg     1.7     02-24    TPro  6.5  /  Alb  3.1<L>  /  TBili  2.3<H>  /  DBili  1.0<H>  /  AST  90<H>  /  ALT  38  /  AlkPhos  98  02-24    Liver panel trend:  TBili 2.3   /   AST 90   /   ALT 38   /   AlkP 98   /   Tptn 6.5   /   Alb 3.1    /   DBili 1.0      02-24  TBili 2.2   /   AST 70   /   ALT 32   /   AlkP 117   /   Tptn 6.2   /   Alb 2.9    /   DBili 0.9      02-23  TBili 2.3   /   AST 63   /   ALT 29   /   AlkP 87   /   Tptn 6.0   /   Alb 2.8    /   DBili --      02-22  TBili 2.9   /   AST 63   /   ALT 28   /   AlkP 94   /   Tptn 6.6   /   Alb 3.0    /   DBili --      02-21  TBili 2.7   /   AST 64   /   ALT 26   /   AlkP 110   /   Tptn 7.2   /   Alb 3.4    /   DBili --      02-19  TBili 2.9   /   AST 78   /   ALT 27   /   AlkP 124   /   Tptn 7.3   /   Alb 3.2    /   DBili 1.1      02-18  TBili 4.6   /   AST 74   /   ALT 25   /   AlkP 110   /   Tptn 7.1   /   Alb 3.2    /   DBili 1.5      02-17  TBili 4.7   /   AST 79   /   ALT 29   /   AlkP 132   /   Tptn 8.1   /   Alb 3.6    /   DBili --      02-16      PT/INR - ( 24 Feb 2020 05:50 )   PT: 18.10 sec;   INR: 1.57 ratio         PTT - ( 24 Feb 2020 05:50 )  PTT:32.5 sec       Radiology: (reviewed by attending)

## 2020-02-24 NOTE — DISCHARGE NOTE PROVIDER - CARE PROVIDER_API CALL
Kayla Mooney)  Internal Medicine  [address] [place] [address]  Phone: [phone]  Fax: [phone]  Follow Up Time: 1 week

## 2020-02-24 NOTE — DISCHARGE NOTE PROVIDER - HOSPITAL COURSE
HPI:    39 year old male patient, chronic alcohol abuse, homeless, no other past medical history presenting for epigastric pain.     The patient has been drinking a 12 pack beer daily as well as hard liquor (3 cups of Vodka daily)     He states that he has been experiencing epigastric pain for a week, radiating to the left side of the back, very severe, patient unsure of the quality of the pain, accompanied with subjective fever as well as diarrhea, bilious emesis and nausea. He otherwise denies hematemesis, hematochezia, melena, dyspnea, cough. He reports a chest pressure sensation that has been present for a month and that is exacerbated by PO intake.         In the ED , patient was found to be in alcohol withdrawal, tachycardic up to 128, and hypertensive, no fever, and CIWA protocol initiated, CIWA score of 4 noted, patient was started on Ativan taper, Thiamine, Folic acid and multivitamins (17 Feb 2020 01:26)        HOSPITAL COURSE: Patient was admitted for management of EtOH withdrawal, ativan taper and CIWAS was initiated. CT and US showed findings consistent with cirrhosis. Patient INR was elevated. GI was consulted, plan was for endoscopy 2/20 to r/o varices. On 2/19, pt was alert and orientated in AM. Mental status deteriorated in 2/19 PM, pt was seen jumping on bed and sustained a fall. CT head for fall was done, was negative.         REASON FOR UPGRADE: Patient disorientated in AM of 2/20. Did not respond to ativan 1mg, then 2mg, then haldol 5mg. ICU was contacted. Taper was changed to 2mg ativan Q2. Pulm fellow assessed patient, [facility] ICU care. Pt required several pushes of Ativan, received >10mg IV/IM since 6AM; prior to ICU upgrade.        2 EKGS were performed 2/20 AM as patient was tachycardic to 140s-150s. EKG showing sinus tachycardia times 2. Patient had low grade temperature of 99.        2/21/20: Patient was lethargic in the morning after getting precedex and ativan 2 mg q2hrs. Ativan held and precedex stopped. Patient woke up, but still sleepy. Patient confused, a&o x1, following commands, speaking Malagasy. Patient hypotensive, but fluid responsive. Mental status progressively improved during the day. Pt was awake and eating by dinner. Overnight pt required minimal ativan. received 1 dose on 2/22 in the AM. CIWA 3-4.     pt stable for downgrade to the floor.        After being downgraded, the patient was vitally stable and had a CIWA of 1. As per gastroenterology, patient is to undergo an outpatient EGD. The patient was aware of the plan of care and is agreeable to the circumstances surrounding their discharge. The patient's hospital stay was otherwise uneventful. HPI:    39 year old male patient, chronic alcohol abuse, homeless, no other past medical history presenting for epigastric pain.     The patient has been drinking a 12 pack beer daily as well as hard liquor (3 cups of Vodka daily)     He states that he has been experiencing epigastric pain for a week, radiating to the left side of the back, very severe, patient unsure of the quality of the pain, accompanied with subjective fever as well as diarrhea, bilious emesis and nausea. He otherwise denies hematemesis, hematochezia, melena, dyspnea, cough. He reports a chest pressure sensation that has been present for a month and that is exacerbated by PO intake.         In the ED , patient was found to be in alcohol withdrawal, tachycardic up to 128, and hypertensive, no fever, and CIWA protocol initiated, CIWA score of 4 noted, patient was started on Ativan taper, Thiamine, Folic acid and multivitamins (17 Feb 2020 01:26)        HOSPITAL COURSE: Patient was admitted for management of EtOH withdrawal, ativan taper and CIWAS was initiated. CT and US showed findings consistent with cirrhosis. Patient INR was elevated. GI was consulted, plan was for endoscopy 2/20 to r/o varices. On 2/19, pt was alert and orientated in AM. Mental status deteriorated in 2/19 PM, pt was seen jumping on bed and sustained a fall. CT head for fall was done, was negative.         REASON FOR UPGRADE: Patient disorientated in AM of 2/20. Did not respond to ativan 1mg, then 2mg, then haldol 5mg. ICU was contacted. Taper was changed to 2mg ativan Q2. Pulm fellow assessed patient, [facility] ICU care. Pt required several pushes of Ativan, received >10mg IV/IM since 6AM; prior to ICU upgrade.        2 EKGS were performed 2/20 AM as patient was tachycardic to 140s-150s. EKG showing sinus tachycardia times 2. Patient had low grade temperature of 99.        2/21/20: Patient was lethargic in the morning after getting precedex and ativan 2 mg q2hrs. Ativan held and precedex stopped. Patient woke up, but still sleepy. Patient confused, a&o x1, following commands, speaking Palestinian. Patient hypotensive, but fluid responsive. Mental status progressively improved during the day. Pt was awake and eating by dinner. Overnight pt required minimal ativan. received 1 dose on 2/22 in the AM. CIWA 3-4.     pt stable for downgrade to the floor.        After being downgraded, the patient was vitally stable and had a CIWA of 1. EEG was negative. As per gastroenterology, patient is to undergo an outpatient EGD. The patient was aware of the plan of care and is agreeable to the circumstances surrounding their discharge. The patient's hospital stay was otherwise uneventful.         Last progress note:         39 year old male patient, chronic alcohol abuse, homeless, no other past medical history presenting for epigastric pain.         #Alcohol withdrawal, delirium tremens    - Ativan to 2mg q4 PRN    - CIWA trending down    - MVI, folate, thiamine    - routine EEG neg    - Neurology following        #Alcohol hepatitis/liver cirrhosis    - CLD work up: HBC< HCV neg, H/O HAV infection, ceruloplasmin neg, iron studies neg for iron overload (ferritin normal), MARCUS, AMA, SMA, Anti HEV neg    - Prednisone 20mg once    - No EGD as per GI    - Trend INR, LFTs    - Repeat US abdomen and AFP in 6 months    - GI following        #Thrombocytopenia    - Most likely from alcohol abuse    - Trend CBC        #Suspected thiamine, magnesium and folate deficiency    - continue with supplementation        DVT: Lovenox    GI: Protonix    Diet: DASH    Activity: As tolerated    Code: full    Disp: DC planning

## 2020-02-24 NOTE — DISCHARGE NOTE PROVIDER - NSDCCPCAREPLAN_GEN_ALL_CORE_FT
PRINCIPAL DISCHARGE DIAGNOSIS  Diagnosis: Alcohol withdrawal  Assessment and Plan of Treatment: You presented to the hospital with signs of alcohol withdrawal. You were evaluated by the medical team and were treated for withdrawal symptoms. Gastroenterology specialists evaluated you, and alongside imaging workup you have evidence suggestive of cirrhosis, possibly secondary to alcohol use. Please see GI as outpatient, as you will need an EGD. Please follow up with your primary care providers after you are discharged from the hospital. It is important that you continue taking your medications as prescribed. If your condition acutely worsens, please return to the ED immediately. PRINCIPAL DISCHARGE DIAGNOSIS  Diagnosis: Alcohol withdrawal  Assessment and Plan of Treatment: You presented to the hospital with signs of alcohol withdrawal. You were evaluated by the medical team and were treated for withdrawal symptoms. Gastroenterology specialists evaluated you, and alongside imaging workup you have evidence suggestive of cirrhosis, possibly secondary to alcohol use. Please see GI as outpatient, as you will need an EGD. Please follow up with your primary care providers after you are discharged from the hospital. It is important that you continue taking your medications as prescribed. If your condition acutely worsens, please return to the ED immediately.  In addition, it is very much recommended that you obtain folic acid, thiamine, and a multivitamin as over the counter medication as you do not have insurance that would cover for your outpatient medications. If you have an upset stomach or stomach ache, please obtain pepcid (or famotidine) in order to help combat these symptoms.

## 2020-02-24 NOTE — DIETITIAN INITIAL EVALUATION ADULT. - FACTORS AFF FOOD INTAKE
Pt Irish speaking, but knows some English. Pt reports eating well - documented po intake 100%. LBM 2/23. No Gi distress.  No issues chewing/swallowing. NKFA. Does not take nutrition supplements. Regular diet PTA./none

## 2020-02-24 NOTE — CONSULT NOTE ADULT - ASSESSMENT
IMPRESSION: Rehab of   debility, history of etoh abuse, continued etoh will put him at risk for alcoholic cerebellar degeration    PRECAUTIONS: [  ] Cardiac  [  ] Respiratory  [  ] Seizures [  ] Contact Isolation  [  ] Droplet Isolation  [  ] Other    Weight Bearing Status:     RECOMMENDATION:    Out of Bed to Chair     DVT/Decubiti Prophylaxis    REHAB PLAN:     [ x  ] Bedside P/T 3-5 times a week   [   ]   Bedside O/T  2-3 times a week             [   ] No Rehab Therapy Indicated                   [   ]  Speech Therapy   Conditioning/ROM                                    ADL  Bed Mobility                                               Conditioning/ROM  Transfers                                                     Bed Mobility  Sitting /Standing Balance                         Transfers                                        Gait Training                                               Sitting/Standing Balance  Stair Training [   ]Applicable                    Home equipment Eval                                                                        Splinting  [   ] Only      GOALS:   ADL   [x   ]   Independent                    Transfers  [x   ] Independent                          Ambulation  [x   ] Independent     [x    ] With device                            [   ]  CG                                                         [   ]  CG                                                                  [   ] CG                            [    ] Min A                                                   [   ] Min A                                                              [   ] Min  A          DISCHARGE PLAN:   [   ]  Good candidate for Intensive Rehabilitation/Hospital based-4A [facility]                                             Will tolerate 3hrs Intensive Rehab Daily                                       [    ]  Short Term Rehab in Skilled Nursing Facility                                       [ x   ] Adult Home with Outpatient or VN services or detox rehab for etoh abuse                                         [    ]  Possible Candidate for Intensive Hospital based Rehab

## 2020-02-24 NOTE — DISCHARGE NOTE PROVIDER - NSFOLLOWUPCLINICS_GEN_ALL_ED_FT
[facility]  Medicine  [address], [place]   Phone: [phone]  Fax:   Follow Up Time:

## 2020-02-24 NOTE — PROGRESS NOTE ADULT - SUBJECTIVE AND OBJECTIVE BOX
HPI:  39 year old male patient, chronic alcohol abuse, homeless, no other past medical history presenting for epigastric pain.   The patient has been drinking a 12 pack beer daily as well as hard liquor (3 cups of Vodka daily)   He states that he has been experiencing epigastric pain for a week, radiating to the left side of the back, very severe, patient unsure of the quality of the pain, accompanied with subjective fever as well as diarrhea, bilious emesis and nausea. He otherwise denies hematemesis, hematochezia, melena, dyspnea, cough. He reports a chest pressure sensation that has been present for a month and that is exacerbated by PO intake.     In the ED , patient was found to be in alcohol withdrawal, tachycardic up to 128, and hypertensive, no fever, and CIWA protocol initiated, CIWA score of 4 noted, patient was started on Ativan taper, Thiamine, Folic acid and multivitamins (17 Feb 2020 01:26)    Currently admitted to medicine with the primary diagnosis of Alcohol withdrawal     Transfer documentation:   HOSPITAL COURSE: Patient was admitted for management of EtOH withdrawal, ativan taper and CIWAS was initiated. CT and US showed findings consistent with cirrhosis. Patient INR was elevated. GI was consulted, plan was for endoscopy 2/20 to r/o varices. On 2/19, pt was alert and orientated in AM. Mental status deteriorated in 2/19 PM, pt was seen jumping on bed and sustained a fall. CT head for fall was done, was negative.     REASON FOR UPGRADE: Patient disorientated in AM of 2/20. Did not respond to ativan 1mg, then 2mg, then haldol 5mg. ICU was contacted. Taper was changed to 2mg ativan Q2. Pulm fellow assessed patient, [facility] ICU care. Pt required several pushes of Ativan, received >10mg IV/IM since 6AM; prior to ICU upgrade.    2 EKGS were performed 2/20 AM as patient was tachycardic to 140s-150s. EKG showing sinus tachycardia times 2. Patient had low grade temperature of 99.    2/21/20: Patient was lethargic in the morning after getting precedex and ativan 2 mg q2hrs. Ativan held and precedex stopped. Patient woke up, but still sleepy. Patient confused, a&o x1, following commands, speaking [place]ym[place]. Patient hypotensive, but fluid responsive. Mental status progressively improved during the day. Pt was awake and eating by dinner. Overnight pt required minimal ativan. received 1 dose on 2/22 in the AM. CIWA 3-4.   pt stable for downgrade to the floor.    Today is hospital day 7d.     INTERVAL HPI / OVERNIGHT EVENTS:  Patient was examined and seen at bedside. This morning he is resting comfortably in bed and reports no new issues or overnight events. Patient is Iraqi speaking. Complained of some abdominal pain, worsened with palpation. Denied chest pain, shortness of breath, or bloody bm. Admitted to having some blood tinged vomit in the past.    PAST MEDICAL & SURGICAL HISTORY  Chronic alcohol abuse  No significant past surgical history    ALLERGIES  Allergy Status Unknown    MEDICATIONS  STANDING MEDICATIONS  chlorhexidine 4% Liquid 1 Application(s) Topical <User Schedule>  folic acid 1 milliGRAM(s) Oral daily  influenza   Vaccine 0.5 milliLiter(s) IntraMuscular once  lactated ringers. 1000 milliLiter(s) IV Continuous <Continuous>  lactulose Syrup 15 Gram(s) Oral two times a day  magnesium oxide 400 milliGRAM(s) Oral three times a day with meals  multivitamin 1 Tablet(s) Oral daily  pantoprazole  Injectable 40 milliGRAM(s) IV Push two times a day  predniSONE   Tablet 40 milliGRAM(s) Oral daily  thiamine Injectable 100 milliGRAM(s) IntraMuscular once    PRN MEDICATIONS  LORazepam   Injectable 2 milliGRAM(s) IV Push every 4 hours PRN    VITALS:  T(F): 97.8  HR: 73  BP: 148/92  RR: 18  SpO2: --    PHYSICAL EXAM  GEN: NAD, Resting comfortably in bed  PULM: Clear to auscultation bilaterally, No wheezes  CVS: Regular rate and rhythm, S1-S2  ABD: Soft, non-tender, non-distended, no guarding  EXT: No edema  NEURO: AAOx3, no focal deficits    LABS                        11.4   3.78  )-----------( 86       ( 24 Feb 2020 05:50 )             35.5     02-24    142  |  106  |  5<L>  ----------------------------<  93  3.6   |  23  |  0.6<L>    Ca    8.4<L>      24 Feb 2020 05:50  Mg     1.6     02-23    TPro  6.5  /  Alb  3.1<L>  /  TBili  2.3<H>  /  DBili  1.0<H>  /  AST  90<H>  /  ALT  38  /  AlkPhos  98  02-24    PT/INR - ( 24 Feb 2020 05:50 )   PT: 18.10 sec;   INR: 1.57 ratio         PTT - ( 24 Feb 2020 05:50 )  PTT:32.5 sec      RADIOLOGY    < from: Xray Chest 1 View- PORTABLE-Routine (02.22.20 @ 05:59) >  Impression:      No radiographic evidence of acute cardiopulmonary disease.    < end of copied text >    < from: CT Head No Cont (02.19.20 @ 18:02) >  IMPRESSION:     No evidence of acute intracranial pathology.    < end of copied text >    < from: US Abdomen Limited (02.16.20 @ 22:41) >  IMPRESSION:    Nodular contour of the liver raising a question of cirrhosis.    Suboptimally imaged gallbladder without definite cholelithiasis or wall thickening.    < end of copied text >    < from: CT Abdomen and Pelvis w/ IV Cont (02.16.20 @ 20:19) >  IMPRESSION:   1.  No CT evidence of acute abdominopelvic pathology.  2.  Severe fatty infiltrated nodular cirrhotic liver.    < end of copied text >

## 2020-02-24 NOTE — DISCHARGE NOTE PROVIDER - NSDCFUADDINST_GEN_ALL_CORE_FT
It is recommended that you please obtain thiamine, folic acid, and a multivitamin over the counter. Please obtain over the counter pepcid (or famotidine) for upset stomach or stomach pain. You do not have insurance to provide coverage for your medications, so it is important that you obtain this as outpatient.

## 2020-02-24 NOTE — PROGRESS NOTE ADULT - ASSESSMENT
39 year old male patient, chronic alcohol abuse, homeless, no other past medical history presenting for epigastric pain.     #Alcohol withdrawal, delirium tremens  - Ativan to 2mg q4 PRN  - CIWA 1 this AM per RN  - MVI, folate, thiamine  - routine EEG neg  - Neurology following    #Alcohol hepatitis/liver cirrhosis  - CLD work up: HBC< HCV neg, H/O HAV infection, ceruloplasmin neg, iron studies neg for iron overload (ferritin normal), MARCUS, AMA, SMA, Anti HEV neg  - Received patient on prednisone 40mg QD   - EGD Today  - Trend INR, LFTs  - Repeat US abdomen and AFP in 6 months  - GI following    #Thrombocytopenia  - Most likely from alcohol abuse  - Trend CBC    #Suspected thiamine and folate deficiency  - continue with supplementation    DVT: Lovenox  GI: Protonix  Diet: DASH  Activity: As tolerated  Code: full 39 year old male patient, chronic alcohol abuse, homeless, no other past medical history presenting for epigastric pain.     #Alcohol withdrawal, delirium tremens  - Ativan to 2mg q4 PRN  - CIWA 1 this AM per RN  - MVI, folate, thiamine  - routine EEG neg  - Neurology following    #Alcohol hepatitis/liver cirrhosis  - CLD work up: HBC< HCV neg, H/O HAV infection, ceruloplasmin neg, iron studies neg for iron overload (ferritin normal), MARCUS, AMA, SMA, Anti HEV neg  - Received patient on prednisone 40mg QD   - EGD Today?  - Follow up GI  - Trend INR, LFTs  - Repeat US abdomen and AFP in 6 months  - GI following    #Thrombocytopenia  - Most likely from alcohol abuse  - Trend CBC    #Suspected thiamine and folate deficiency  - continue with supplementation    DVT: Lovenox  GI: Protonix  Diet: DASH  Activity: As tolerated  Code: full

## 2020-02-24 NOTE — PROGRESS NOTE ADULT - ASSESSMENT
39 year old male patient, homeless, active alcohol abuse, no other known past medical history presenting for epigastric pain and vomiting for the last 2 days.    # Abdominal pain/diarrhea/vomiting/elevated LFTS: Likely acute alcoholic hepatitis: improving  Alcoholic liver cirrhosis (alcohol, thrombocytopenia, elevated LFTS, AST/ALT ratio reversal, US abdomen showed nodular liver)  Mental status intact, Coagulopathy +  Lilie's score 0.016 likely patient has good prognosis on day # 3  [place] score 0.029 at day 7  CLD work up: HBC< HCV neg, H/O HAV infection, ceruloplasmin neg, iron studies neg for iron overload (ferritin normal), MARCUS, AMA, SMA, Anti HEV neg  Gamma Globulin slightly elevated    Rec:  Please trend liver enzymes, CMP, INR daily.  Monitor electrolytes, mental status.  c/w Prednisolone 40mg daily for alcoholic hepatitis started 2/18 ,  continue prednisolone for 28 days, and finish therapy with a 16-day prednisolone taper ( decrease the dose by 10 mg per day every four days until a dose of 10 mg per day is reached, at which point we decrease it by 5 mg per day every three days).    Alcohol abuse:  Ativan protocol for alcohol withdrawal.  Folate, MVI and thiamine  Alcohol cessation      # Vomiting with blood tinge : Likely Aziza Chau vs PUD vs erosive esophagitis vs erosive gastritis/duodenitis ( resolved)   Hemodynamically stable  No active bleeding  Hg stable    Rec:  c/w protonix 40mg BID  Trend CBC daily  Will need EGD as outpatient unless patient had significant GI bleed      # Esophageal varices  needs screening EGD as outpatient     #No ascites on US abdomen  No Known H/O HE      # HCC screening:  AFP neg  CT abdomen with con: No lesions in liver for HCC  Repeat US abdomen and AFP in 6 months

## 2020-02-24 NOTE — PHYSICAL THERAPY INITIAL EVALUATION ADULT - GAIT DISTANCE, PT EVAL
pt attempted amb 6'x1 FW without asst device contact guard x1 person assist, Pt was steadier using RW

## 2020-02-24 NOTE — CONSULT NOTE ADULT - SUBJECTIVE AND OBJECTIVE BOX
HPI:  39 year old male patient, chronic alcohol abuse, homeless, no other past medical history presenting for epigastric pain.   The patient has been drinking a 12 pack beer daily as well as hard liquor (3 cups of Vodka daily)   He states that he has been experiencing epigastric pain for a week, radiating to the left side of the back, very severe, patient unsure of the quality of the pain, accompanied with subjective fever as well as diarrhea, bilious emesis and nausea. He otherwise denies hematemesis, hematochezia, melena, dyspnea, cough. He reports a chest pressure sensation that has been present for a month and that is exacerbated by PO intake.     In the ED , patient was found to be in alcohol withdrawal, tachycardic up to 128, and hypertensive, no fever, and CIWA protocol initiated, CIWA score of 4 noted, patient was started on Ativan taper, Thiamine, Folic acid and multivitamins (17 Feb 2020 01:26)      PAST MEDICAL & SURGICAL HISTORY:  Chronic alcohol abuse  No significant past surgical history      Hospital Course:  He amb OK with a rolling walker.  TODAY'S SUBJECTIVE & REVIEW OF SYMPTOMS:     Constitutional WNL   Cardio WNL   Resp WNL   GI WNL  Heme WNL  Endo WNL  Skin WNL  MSK WNL  Neuro WNL  Cognitive WNL  Psych WNL      MEDICATIONS  (STANDING):  chlorhexidine 4% Liquid 1 Application(s) Topical <User Schedule>  folic acid 1 milliGRAM(s) Oral daily  influenza   Vaccine 0.5 milliLiter(s) IntraMuscular once  lactated ringers. 1000 milliLiter(s) (75 mL/Hr) IV Continuous <Continuous>  lactulose Syrup 15 Gram(s) Oral two times a day  magnesium oxide 400 milliGRAM(s) Oral three times a day with meals  multivitamin 1 Tablet(s) Oral daily  pantoprazole  Injectable 40 milliGRAM(s) IV Push two times a day  predniSONE   Tablet 40 milliGRAM(s) Oral daily  thiamine 100 milliGRAM(s) Oral daily    MEDICATIONS  (PRN):  LORazepam   Injectable 2 milliGRAM(s) IV Push every 4 hours PRN Agitation      FAMILY HISTORY:  No pertinent family history in first degree relatives      Allergies    Allergy Status Unknown    Intolerances        SOCIAL HISTORY:    [  ] Etoh  [  ] Smoking  [  ] Substance abuse     Home Environment:  [ x ] Home Alone  [  ] Lives with Family  [  ] Home Health Aid    Dwelling:  [  ] Apartment  [  ] Private House  [  ] Adult Home  [  ] Skilled Nursing Facility      [  ] Short Term  [  ] Long Term  [  ] Stairs       Elevator [  ]    FUNCTIONAL STATUS PTA: (Check all that apply)  Ambulation: [   ]Independent    [  ] Dependent     [  ] Non-Ambulatory  Assistive Device: [  ] SA Cane  [  ]  Q Cane  [  ] Walker  [  ]  Wheelchair  ADL : [  ] Independent  [  ]  Dependent       Vital Signs Last 24 Hrs  T(C): 37.3 (24 Feb 2020 12:40), Max: 37.3 (24 Feb 2020 12:40)  T(F): 99.1 (24 Feb 2020 12:40), Max: 99.1 (24 Feb 2020 12:40)  HR: 101 (24 Feb 2020 12:40) (73 - 101)  BP: 133/75 (24 Feb 2020 12:40) (133/75 - 159/86)  BP(mean): --  RR: 18 (24 Feb 2020 12:40) (18 - 18)  SpO2: 99% (24 Feb 2020 12:35) (99% - 99%)      PHYSICAL EXAM: Alert & Oriented X3  GENERAL: NAD, well-groomed, well-developed  HEAD:  Atraumatic, Normocephalic  EYES: EOMI, PERRLA, conjunctiva and sclera clear  NECK: Supple, No JVD, Normal thyroid  CHEST/LUNG: Clear bilaterally; No rales, rhonchi, wheezing, or rubs  HEART: Regular rate and rhythm; No murmurs, rubs, or gallops  ABDOMEN: Soft, Nontender, Nondistended; Bowel sounds present  EXTREMITIES:  2+ Peripheral Pulses, No clubbing, cyanosis, or edema    NERVOUS SYSTEM:  Cranial Nerves 2-12 intact [  ] Abnormal  [  ]  ROM: WFL all extremities [  ]  Abnormal [  ]  Motor Strength: WFL all extremities  [  ]  Abnormal [x  ] 5-/5 LE's  Sensation: intact to light touch [  ] Abnormal [  ]  Reflexes: Symmetric [  ]  Abnormal [  ]    FUNCTIONAL STATUS:  Bed Mobility: Independent [  ]  Supervision [  ]  Needs Assistance [  ]  N/A [  ]  Transfers: Independent [  ]  Supervision [  ]  Needs Assistance [  ]  N/A [  ]   Ambulation: Independent [  ]  Supervision [  ]  Needs Assistance [  ]  N/A [  ]  ADL: Independent [  ] Requires Assistance [  ] N/A [  ]      LABS:                        11.4   3.78  )-----------( 86       ( 24 Feb 2020 05:50 )             35.5     02-24    142  |  106  |  5<L>  ----------------------------<  93  3.6   |  23  |  0.6<L>    Ca    8.4<L>      24 Feb 2020 05:50  Mg     1.7     02-24    TPro  6.5  /  Alb  3.1<L>  /  TBili  2.3<H>  /  DBili  1.0<H>  /  AST  90<H>  /  ALT  38  /  AlkPhos  98  02-24    PT/INR - ( 24 Feb 2020 05:50 )   PT: 18.10 sec;   INR: 1.57 ratio         PTT - ( 24 Feb 2020 05:50 )  PTT:32.5 sec      RADIOLOGY & ADDITIONAL STUDIES:    Assesment:

## 2020-02-24 NOTE — DISCHARGE NOTE PROVIDER - NSDCFUADDAPPT_GEN_ALL_CORE_FT
If you do not have a primary care provider, you can follow up in the medicine clinic. Please call to make an appointment.

## 2020-02-24 NOTE — PHYSICAL THERAPY INITIAL EVALUATION ADULT - GENERAL OBSERVATIONS, REHAB EVAL
10:30-10:55. Pt encountered semifowler in bed in NAD, 2 :1 sitter present at b/s. Pt is Iranian speaking  phone used # [phone]. Pt reported abdominal pain 6/10 increased with mobility. Leann POLK notified. Pt agreeable to PT. 10:30-10:55. Pt encountered semifowler in bed in NAD, 2 :1 sitter present at b/s., + IV locked by Leann POLK for PT/OOB. Pt is English speaking  phone used # [phone]. Pt reported abdominal pain 6/10 increased with mobility. Leann POLK notified. Pt agreeable to PT.

## 2020-02-24 NOTE — DISCHARGE NOTE PROVIDER - NSDCMRMEDTOKEN_GEN_ALL_CORE_FT
folic acid 1 mg oral tablet: 1 tab(s) orally once a day  magnesium oxide 400 mg (241.3 mg elemental magnesium) oral tablet: 1 tab(s) orally 3 times a day (with meals)  pantoprazole 40 mg intravenous injection: 40 milligram(s) intravenous 2 times a day  predniSONE 20 mg oral tablet: 2 tab(s) orally once a day  thiamine 100 mg oral tablet: 1 tab(s) orally once a day folic acid 1 mg oral tablet: 1 tab(s) orally once a day  magnesium oxide 400 mg (241.3 mg elemental magnesium) oral tablet: 1 tab(s) orally 3 times a day (with meals)  pantoprazole 40 mg oral delayed release tablet: 1 tab(s) orally once a day  predniSONE 20 mg oral tablet: 1 tab(s) orally once  thiamine 100 mg oral tablet: 1 tab(s) orally once a day

## 2020-02-24 NOTE — PROGRESS NOTE ADULT - SUBJECTIVE AND OBJECTIVE BOX
NAZARIO WASHINGTON  39y  Male      Patient is a 39y old  Male who presents with a chief complaint of Alcohol abuse (24 Feb 2020 09:14)      INTERVAL HPI/OVERNIGHT EVENTS:      ******************************* REVIEW OF SYSTEMS:**********************************************        All other review of systems negative    *********************** VITALS ******************************************    T(F): 97.8 (02-24-20 @ 05:39)  HR: 73 (02-24-20 @ 05:39) (73 - 102)  BP: 148/92 (02-24-20 @ 05:39) (145/75 - 159/86)  RR: 18 (02-24-20 @ 05:39) (18 - 18)  SpO2: --            ******************************** PHYSICAL EXAM:**************************************************  GENERAL: NAD    PSYCH: no agitation, baseline mentation  HEENT:     NERVOUS SYSTEM:  Alert & Oriented X2    PULMONARY: EDGAR, CTA    CARDIOVASCULAR: S1S2 RRR    GI: Soft, NT, ND; BS present.    EXTREMITIES:  2+ Peripheral Pulses, No clubbing, cyanosis, or edema    LYMPH: No lymphadenopathy noted    SKIN: No rashes or lesions    ******************************************************************************************        **************************** LABS *******************************************************                          11.4   3.78  )-----------( 86       ( 24 Feb 2020 05:50 )             35.5     02-24    142  |  106  |  5<L>  ----------------------------<  93  3.6   |  23  |  0.6<L>    Ca    8.4<L>      24 Feb 2020 05:50  Mg     1.7     02-24    TPro  6.5  /  Alb  3.1<L>  /  TBili  2.3<H>  /  DBili  1.0<H>  /  AST  90<H>  /  ALT  38  /  AlkPhos  98  02-24        PT/INR - ( 24 Feb 2020 05:50 )   PT: 18.10 sec;   INR: 1.57 ratio         PTT - ( 24 Feb 2020 05:50 )  PTT:32.5 sec  Lactate Trend        CAPILLARY BLOOD GLUCOSE              **************************Active Medications *******************************************  Allergy Status Unknown      chlorhexidine 4% Liquid 1 Application(s) Topical <User Schedule>  folic acid 1 milliGRAM(s) Oral daily  influenza   Vaccine 0.5 milliLiter(s) IntraMuscular once  lactated ringers. 1000 milliLiter(s) IV Continuous <Continuous>  lactulose Syrup 15 Gram(s) Oral two times a day  LORazepam   Injectable 2 milliGRAM(s) IV Push every 4 hours PRN  magnesium oxide 400 milliGRAM(s) Oral three times a day with meals  multivitamin 1 Tablet(s) Oral daily  pantoprazole  Injectable 40 milliGRAM(s) IV Push two times a day  predniSONE   Tablet 40 milliGRAM(s) Oral daily  thiamine Injectable 100 milliGRAM(s) IntraMuscular once      ***************************************************  RADIOLOGY & ADDITIONAL TESTS:    Imaging Personally Reviewed:  [ ] YES  [ ] NO    HEALTH ISSUES - PROBLEM Dx:

## 2020-02-24 NOTE — PHYSICAL THERAPY INITIAL EVALUATION ADULT - CRITERIA FOR SKILLED THERAPEUTIC INTERVENTIONS
Pt does not need acute skilled PT, can ambulate on unit with unit staff using RW./anticipated discharge recommendation

## 2020-02-24 NOTE — PROGRESS NOTE ADULT - ASSESSMENT
39 year old male patient, homeless, active alcohol abuse, no other known past medical history presenting for epigastric pain and vomiting for the last 2 days.    # Likely acute alcoholic hepatitis: improving  Alcoholic liver cirrhosis   CLD work up: HBC< HCV neg, H/O HAV infection, ceruloplasmin neg, iron studies neg for iron overload (ferritin normal),   MARCUS, AMA, SMA, Anti HEV neg  Gamma Globulin slightly elevated    # Severe ETOH withdrawal/ DTs    on Ativan /CIWA protocol     # R/O UGI bleed >>>> Undergoing EGD today.     # Possible superior mediastinal mass seen on CXR    # Suspected thiamine / folate  def >> on supplement.  # Hypokalemia / hypomagnesemia >> being replated.       DVT/GI PPX.    Will need PT/OT  Social service eval.

## 2020-02-24 NOTE — DIETITIAN INITIAL EVALUATION ADULT. - OTHER INFO
Pt adm for alcohol abuse. Likely acute alcoholic hepatitis: improving. Vomiting with blood tinge: Likely Aziza Chau vs PUD vs erosive esophagitis vs erosive gastritis/duodenitis - resolved. EGD outpatient. GI following. Hypokalemia / hypomagnesemia - repleted. Social service eval - pt homeless.

## 2020-02-24 NOTE — DIETITIAN INITIAL EVALUATION ADULT. - ENERGY NEEDS
calorie: 1527 - 1679 kcals/day (MSJ x 1.0 - 1.1 AF for obesity)  protein: 59 - 69 gms/day (1.2 - 1.4 gm/kg IBW for obesity)  fluid: 1ml/kcal or per LIP

## 2020-02-25 VITALS — DIASTOLIC BLOOD PRESSURE: 70 MMHG | TEMPERATURE: 98 F | SYSTOLIC BLOOD PRESSURE: 132 MMHG | RESPIRATION RATE: 18 BRPM

## 2020-02-25 LAB
ALBUMIN SERPL ELPH-MCNC: 2.9 G/DL — LOW (ref 3.5–5.2)
ALP SERPL-CCNC: 100 U/L — SIGNIFICANT CHANGE UP (ref 30–115)
ALT FLD-CCNC: 47 U/L — HIGH (ref 0–41)
ANION GAP SERPL CALC-SCNC: 10 MMOL/L — SIGNIFICANT CHANGE UP (ref 7–14)
APTT BLD: 30.4 SEC — SIGNIFICANT CHANGE UP (ref 27–39.2)
AST SERPL-CCNC: 86 U/L — HIGH (ref 0–41)
BILIRUB DIRECT SERPL-MCNC: 0.8 MG/DL — HIGH (ref 0–0.2)
BILIRUB INDIRECT FLD-MCNC: 1.1 MG/DL — SIGNIFICANT CHANGE UP (ref 0.2–1.2)
BILIRUB SERPL-MCNC: 1.9 MG/DL — HIGH (ref 0.2–1.2)
BUN SERPL-MCNC: 8 MG/DL — LOW (ref 10–20)
CALCIUM SERPL-MCNC: 8.4 MG/DL — LOW (ref 8.5–10.1)
CHLORIDE SERPL-SCNC: 104 MMOL/L — SIGNIFICANT CHANGE UP (ref 98–110)
CO2 SERPL-SCNC: 24 MMOL/L — SIGNIFICANT CHANGE UP (ref 17–32)
CREAT SERPL-MCNC: 0.6 MG/DL — LOW (ref 0.7–1.5)
GLUCOSE SERPL-MCNC: 107 MG/DL — HIGH (ref 70–99)
HCT VFR BLD CALC: 34.5 % — LOW (ref 42–52)
HGB BLD-MCNC: 10.8 G/DL — LOW (ref 14–18)
INR BLD: 1.61 RATIO — HIGH (ref 0.65–1.3)
MAGNESIUM SERPL-MCNC: 1.6 MG/DL — LOW (ref 1.8–2.4)
MCHC RBC-ENTMCNC: 23.7 PG — LOW (ref 27–31)
MCHC RBC-ENTMCNC: 31.3 G/DL — LOW (ref 32–37)
MCV RBC AUTO: 75.8 FL — LOW (ref 80–94)
NRBC # BLD: 0 /100 WBCS — SIGNIFICANT CHANGE UP (ref 0–0)
PLATELET # BLD AUTO: 96 K/UL — LOW (ref 130–400)
POTASSIUM SERPL-MCNC: 3.3 MMOL/L — LOW (ref 3.5–5)
POTASSIUM SERPL-SCNC: 3.3 MMOL/L — LOW (ref 3.5–5)
PROT SERPL-MCNC: 6.2 G/DL — SIGNIFICANT CHANGE UP (ref 6–8)
PROTHROM AB SERPL-ACNC: 18.5 SEC — HIGH (ref 9.95–12.87)
RBC # BLD: 4.55 M/UL — LOW (ref 4.7–6.1)
RBC # FLD: 22.7 % — HIGH (ref 11.5–14.5)
SODIUM SERPL-SCNC: 138 MMOL/L — SIGNIFICANT CHANGE UP (ref 135–146)
WBC # BLD: 4.67 K/UL — LOW (ref 4.8–10.8)
WBC # FLD AUTO: 4.67 K/UL — LOW (ref 4.8–10.8)

## 2020-02-25 PROCEDURE — 99232 SBSQ HOSP IP/OBS MODERATE 35: CPT

## 2020-02-25 RX ORDER — MAGNESIUM SULFATE 500 MG/ML
2 VIAL (ML) INJECTION ONCE
Refills: 0 | Status: COMPLETED | OUTPATIENT
Start: 2020-02-25 | End: 2020-02-25

## 2020-02-25 RX ORDER — FOLIC ACID 0.8 MG
1 TABLET ORAL
Qty: 30 | Refills: 0
Start: 2020-02-25 | End: 2020-03-25

## 2020-02-25 RX ORDER — THIAMINE MONONITRATE (VIT B1) 100 MG
1 TABLET ORAL
Qty: 30 | Refills: 0
Start: 2020-02-25 | End: 2020-03-25

## 2020-02-25 RX ORDER — PANTOPRAZOLE SODIUM 20 MG/1
1 TABLET, DELAYED RELEASE ORAL
Qty: 30 | Refills: 0
Start: 2020-02-25 | End: 2020-03-25

## 2020-02-25 RX ORDER — MAGNESIUM OXIDE 400 MG ORAL TABLET 241.3 MG
1 TABLET ORAL
Qty: 21 | Refills: 0
Start: 2020-02-25 | End: 2020-03-02

## 2020-02-25 RX ADMIN — CHLORHEXIDINE GLUCONATE 1 APPLICATION(S): 213 SOLUTION TOPICAL at 06:03

## 2020-02-25 RX ADMIN — Medication 1 MILLIGRAM(S): at 11:19

## 2020-02-25 RX ADMIN — MAGNESIUM OXIDE 400 MG ORAL TABLET 400 MILLIGRAM(S): 241.3 TABLET ORAL at 07:50

## 2020-02-25 RX ADMIN — MAGNESIUM OXIDE 400 MG ORAL TABLET 400 MILLIGRAM(S): 241.3 TABLET ORAL at 17:09

## 2020-02-25 RX ADMIN — MAGNESIUM OXIDE 400 MG ORAL TABLET 400 MILLIGRAM(S): 241.3 TABLET ORAL at 11:24

## 2020-02-25 RX ADMIN — Medication 1 TABLET(S): at 11:20

## 2020-02-25 RX ADMIN — PANTOPRAZOLE SODIUM 40 MILLIGRAM(S): 20 TABLET, DELAYED RELEASE ORAL at 06:04

## 2020-02-25 RX ADMIN — SODIUM CHLORIDE 75 MILLILITER(S): 9 INJECTION, SOLUTION INTRAVENOUS at 01:49

## 2020-02-25 RX ADMIN — Medication 50 GRAM(S): at 12:29

## 2020-02-25 RX ADMIN — Medication 100 MILLIGRAM(S): at 11:19

## 2020-02-25 RX ADMIN — Medication 40 MILLIGRAM(S): at 06:04

## 2020-02-25 NOTE — PROGRESS NOTE ADULT - ASSESSMENT
39 year old male patient, chronic alcohol abuse, homeless, no other past medical history presenting for epigastric pain.     #Alcohol withdrawal, delirium tremens  - Ativan to 2mg q4 PRN  - CIWA trending down  - MVI, folate, thiamine  - routine EEG neg  - Neurology following    #Alcohol hepatitis/liver cirrhosis  - CLD work up: HBC< HCV neg, H/O HAV infection, ceruloplasmin neg, iron studies neg for iron overload (ferritin normal), MARCUS, AMA, SMA, Anti HEV neg  - Prednisone 20mg once  - No EGD as per GI  - Trend INR, LFTs  - Repeat US abdomen and AFP in 6 months  - GI following    #Thrombocytopenia  - Most likely from alcohol abuse  - Trend CBC    #Suspected thiamine and folate deficiency  - continue with supplementation    DVT: Lovenox  GI: Protonix  Diet: DASH  Activity: As tolerated  Code: full  Disp: DC planning 39 year old male patient, chronic alcohol abuse, homeless, no other past medical history presenting for epigastric pain.     #Alcohol withdrawal, delirium tremens  - Ativan to 2mg q4 PRN  - CIWA trending down  - MVI, folate, thiamine  - routine EEG neg  - Neurology following    #Alcohol hepatitis/liver cirrhosis  - CLD work up: HBC< HCV neg, H/O HAV infection, ceruloplasmin neg, iron studies neg for iron overload (ferritin normal), MARCUS, AMA, SMA, Anti HEV neg  - Prednisone 20mg once  - No EGD as per GI  - Trend INR, LFTs  - Repeat US abdomen and AFP in 6 months  - GI following    #Thrombocytopenia  - Most likely from alcohol abuse  - Trend CBC    #Suspected thiamine, magnesium and folate deficiency  - continue with supplementation    DVT: Lovenox  GI: Protonix  Diet: DASH  Activity: As tolerated  Code: full  Disp: DC planning

## 2020-02-25 NOTE — PROGRESS NOTE ADULT - ATTENDING COMMENTS
Attending Statement: I have personally performed a face to face diagnostic evaluation on this patient. The patient is suffering from Severe ETOH withdrawal/ DTs.  I have reviewed the above note and agree with the history, exam and suggestions for care, except as I have noted in the text.
38yo M admitted for etoh withdrawal now with newly diagnosed cirrhosis     #etoh withdrawal  -cont with ativan taper   -will need to discuss with pt regarding plan for alcohol rehab prior to discharge     #cirrhosis likely due to alcohol abuse   -planning for inpt EGD to r/o varices   -GI on board   -NPO after midnight tonight   -f/u repeat labs: liver enzymes, CMP, INR daily  -monitor mental status   -Prednisolone 40mg daily for alcoholic hepatitis (Day 3)  -repeat INR after vit K  -cont ppi q12h  -will need functioning iv (pt was agitated today and pulled out iv)    Progress Note Handoff  Pending:  EGD, mental status, ativan taper  Patient/Family discussion: POC discussed with pt at bedside in Yoruba   Disposition: unclear at this time
Attending Statement: I have personally performed a face to face diagnostic evaluation on this patient. The patient is suffering from Severe ETOH withdrawal/ DTs.  I have reviewed the above note and agree with the history, exam and suggestions for care, except as I have noted in the text.
I personally interviewed and  examined the patient.Pt with alcoholic hepatitis. Monitor T. Bili and INR
38 YO M with a PMh of EtOH abuse who presents with c/o epigastric pain. Found to likely have alcoholic cirrhosis and is currently awaiting results of his CLD work-up. Detox os following for EtOH withdrawal and is currently on a benzo taper. GI will perform EGD when pt is through his withdrawals and blood work has stabilized. Physical exam shows pt in NAD with normal VSs. No tongue fasciculations, and minimal tremors. ABD is soft and non-tender. Hypoactive BSs. Labs and radiology as above. C/w IVFs, benzo taper, GI FU, and daily CMPs. Rest as per above note.

## 2020-02-25 NOTE — PROGRESS NOTE ADULT - SUBJECTIVE AND OBJECTIVE BOX
HPI:  Currently admitted to medicine with the primary diagnosis of Alcohol withdrawal    Today is hospital day 8d.     INTERVAL HPI / OVERNIGHT EVENTS:  Patient was examined and seen at bedside. This morning he is resting comfortably in bed and reports no new issues or overnight events. Patient is [place] speaking. Denied chest pain, shortness of breath, or bloody bm.     PAST MEDICAL & SURGICAL HISTORY  Chronic alcohol abuse  No significant past surgical history    ALLERGIES  Allergy Status Unknown    MEDICATIONS  STANDING MEDICATIONS  chlorhexidine 4% Liquid 1 Application(s) Topical <User Schedule>  folic acid 1 milliGRAM(s) Oral daily  influenza   Vaccine 0.5 milliLiter(s) IntraMuscular once  lactated ringers. 1000 milliLiter(s) IV Continuous <Continuous>  lactulose Syrup 15 Gram(s) Oral two times a day  magnesium oxide 400 milliGRAM(s) Oral three times a day with meals  multivitamin 1 Tablet(s) Oral daily  pantoprazole  Injectable 40 milliGRAM(s) IV Push two times a day  predniSONE   Tablet 40 milliGRAM(s) Oral daily  thiamine Injectable 100 milliGRAM(s) IntraMuscular once    PRN MEDICATIONS  LORazepam   Injectable 2 milliGRAM(s) IV Push every 4 hours PRN    VITALS:  T(F): 97.8  HR: 73  BP: 148/92  RR: 18  SpO2: --    PHYSICAL EXAM  GEN: NAD, Resting comfortably in bed  PULM: Clear to auscultation bilaterally, No wheezes  CVS: Regular rate and rhythm, S1-S2  ABD: Soft non-distended, no guarding. Some tenderness in the mid abdomen to palpation  EXT: No edema  NEURO: AAOx3, no focal deficits    LABS                        11.4   3.78  )-----------( 86       ( 24 Feb 2020 05:50 )             35.5     02-24    142  |  106  |  5<L>  ----------------------------<  93  3.6   |  23  |  0.6<L>    Ca    8.4<L>      24 Feb 2020 05:50  Mg     1.6     02-23    TPro  6.5  /  Alb  3.1<L>  /  TBili  2.3<H>  /  DBili  1.0<H>  /  AST  90<H>  /  ALT  38  /  AlkPhos  98  02-24    PT/INR - ( 24 Feb 2020 05:50 )   PT: 18.10 sec;   INR: 1.57 ratio         PTT - ( 24 Feb 2020 05:50 )  PTT:32.5 sec      RADIOLOGY    < from: Xray Chest 1 View- PORTABLE-Routine (02.22.20 @ 05:59) >  Impression:      No radiographic evidence of acute cardiopulmonary disease.    < end of copied text >    < from: CT Head No Cont (02.19.20 @ 18:02) >  IMPRESSION:     No evidence of acute intracranial pathology.    < end of copied text >    < from: US Abdomen Limited (02.16.20 @ 22:41) >  IMPRESSION:    Nodular contour of the liver raising a question of cirrhosis.    Suboptimally imaged gallbladder without definite cholelithiasis or wall thickening.    < end of copied text >    < from: CT Abdomen and Pelvis w/ IV Cont (02.16.20 @ 20:19) >  IMPRESSION:   1.  No CT evidence of acute abdominopelvic pathology.  2.  Severe fatty infiltrated nodular cirrhotic liver.    < end of copied text >

## 2020-02-25 NOTE — DISCHARGE NOTE NURSING/CASE MANAGEMENT/SOCIAL WORK - PATIENT PORTAL LINK FT
You can access the FollowMyHealth Patient Portal offered by [facility] by registering at the following website: [email or web address]. By joining [facility]’s FollowMyHealth portal, you will also be able to view your health information using other applications (apps) compatible with our system.

## 2020-02-25 NOTE — PROGRESS NOTE ADULT - REASON FOR ADMISSION
Alcohol abuse

## 2020-02-25 NOTE — PROGRESS NOTE ADULT - ASSESSMENT
39 year old male patient, homeless, active alcohol abuse, no other known past medical history presenting for epigastric pain and vomiting for the last 2 days.    # Likely acute alcoholic hepatitis: improving  Alcoholic liver cirrhosis   CLD work up: HBC< HCV neg, H/O HAV infection, ceruloplasmin neg, iron studies neg for iron overload (ferritin normal),   MARCUS, AMA, SMA, Anti HEV neg  Gamma Globulin slightly elevated    # Severe ETOH withdrawal/ DTs   s/p  Ativan /CIWA protocol     # R/O UGI bleed / Esophageal varices >>>> Outpt EGD .     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    # Suspected thiamine / folate  def >> on supplement.  # Hypokalemia / hypomagnesemia >> being replated.      Can d/c IVF.     DVT/GI PPX.    D/C planning .   Social service eval.

## 2020-02-25 NOTE — PROGRESS NOTE ADULT - SUBJECTIVE AND OBJECTIVE BOX
NAZARIO WASHINGTON  39y  Male      Patient is a 39y old  Male who presents with a chief complaint of Alcohol abuse (25 Feb 2020 10:11)      INTERVAL HPI/OVERNIGHT EVENTS:      ******************************* REVIEW OF SYSTEMS:**********************************************      resting in bed.   All other review of systems negative    *********************** VITALS ******************************************    T(F): 98 (02-25-20 @ 07:16)  HR: 81 (02-25-20 @ 07:16) (81 - 101)  BP: 142/74 (02-25-20 @ 07:16) (133/75 - 142/74)  RR: 18 (02-25-20 @ 07:16) (18 - 18)  SpO2: 99% (02-24-20 @ 12:35) (99% - 99%)    02-24-20 @ 07:01  -  02-25-20 @ 07:00  --------------------------------------------------------  IN: 825 mL / OUT: 0 mL / NET: 825 mL            02-24-20 @ 07:01  -  02-25-20 @ 07:00  --------------------------------------------------------  IN: 825 mL / OUT: 0 mL / NET: 825 mL        ******************************** PHYSICAL EXAM:**************************************************  GENERAL: NAD    PSYCH: no agitation, baseline mentation  HEENT:     NERVOUS SYSTEM:  Alert & Oriented X3,  PULMONARY: EDGAR, CTA    CARDIOVASCULAR: S1S2 RRR    GI: Soft, NT, ND; BS present.    EXTREMITIES:  2+ Peripheral Pulses, No clubbing, cyanosis, or edema    LYMPH: No lymphadenopathy noted    SKIN: No rashes or lesions    ******************************************************************************************      **************************** LABS *******************************************************                          10.8   4.67  )-----------( 96       ( 25 Feb 2020 06:54 )             34.5     02-25    138  |  104  |  8<L>  ----------------------------<  107<H>  3.3<L>   |  24  |  0.6<L>    Ca    8.4<L>      25 Feb 2020 06:54  Mg     1.6     02-25    TPro  6.2  /  Alb  2.9<L>  /  TBili  1.9<H>  /  DBili  0.8<H>  /  AST  86<H>  /  ALT  47<H>  /  AlkPhos  100  02-25        PT/INR - ( 25 Feb 2020 06:54 )   PT: 18.50 sec;   INR: 1.61 ratio         PTT - ( 25 Feb 2020 06:54 )  PTT:30.4 sec  Lactate Trend        CAPILLARY BLOOD GLUCOSE              **************************Active Medications *******************************************  Allergy Status Unknown      chlorhexidine 4% Liquid 1 Application(s) Topical <User Schedule>  folic acid 1 milliGRAM(s) Oral daily  influenza   Vaccine 0.5 milliLiter(s) IntraMuscular once  lactated ringers. 1000 milliLiter(s) IV Continuous <Continuous>  lactulose Syrup 15 Gram(s) Oral two times a day  magnesium oxide 400 milliGRAM(s) Oral three times a day with meals  magnesium sulfate  IVPB 2 Gram(s) IV Intermittent once  multivitamin 1 Tablet(s) Oral daily  pantoprazole  Injectable 40 milliGRAM(s) IV Push two times a day  thiamine 100 milliGRAM(s) Oral daily      ***************************************************  RADIOLOGY & ADDITIONAL TESTS:    Imaging Personally Reviewed:  [ ] YES  [ ] NO    HEALTH ISSUES - PROBLEM Dx:

## 2020-03-02 DIAGNOSIS — I85.10 SECONDARY ESOPHAGEAL VARICES WITHOUT BLEEDING: ICD-10-CM

## 2020-03-02 DIAGNOSIS — E87.6 HYPOKALEMIA: ICD-10-CM

## 2020-03-02 DIAGNOSIS — F10.239 ALCOHOL DEPENDENCE WITH WITHDRAWAL, UNSPECIFIED: ICD-10-CM

## 2020-03-02 DIAGNOSIS — K29.20 ALCOHOLIC GASTRITIS WITHOUT BLEEDING: ICD-10-CM

## 2020-03-02 DIAGNOSIS — D68.9 COAGULATION DEFECT, UNSPECIFIED: ICD-10-CM

## 2020-03-02 DIAGNOSIS — F10.231 ALCOHOL DEPENDENCE WITH WITHDRAWAL DELIRIUM: ICD-10-CM

## 2020-03-02 DIAGNOSIS — E87.3 ALKALOSIS: ICD-10-CM

## 2020-03-02 DIAGNOSIS — R00.0 TACHYCARDIA, UNSPECIFIED: ICD-10-CM

## 2020-03-02 DIAGNOSIS — E83.42 HYPOMAGNESEMIA: ICD-10-CM

## 2020-03-02 DIAGNOSIS — K70.30 ALCOHOLIC CIRRHOSIS OF LIVER WITHOUT ASCITES: ICD-10-CM

## 2020-03-02 DIAGNOSIS — D69.59 OTHER SECONDARY THROMBOCYTOPENIA: ICD-10-CM

## 2020-03-02 DIAGNOSIS — E61.2 MAGNESIUM DEFICIENCY: ICD-10-CM

## 2020-03-02 DIAGNOSIS — E51.9 THIAMINE DEFICIENCY, UNSPECIFIED: ICD-10-CM

## 2020-03-02 DIAGNOSIS — E53.8 DEFICIENCY OF OTHER SPECIFIED B GROUP VITAMINS: ICD-10-CM

## 2020-03-02 DIAGNOSIS — R19.00 INTRA-ABDOMINAL AND PELVIC SWELLING, MASS AND LUMP, UNSPECIFIED SITE: ICD-10-CM

## 2020-03-02 DIAGNOSIS — K70.10 ALCOHOLIC HEPATITIS WITHOUT ASCITES: ICD-10-CM

## 2020-11-30 NOTE — PHYSICAL THERAPY INITIAL EVALUATION ADULT - ORIENTATION, REHAB EVAL
pt is very low spoken, did need to repeat questions via  phone multiple times on occassion/oriented to person, place, time and situation
 used

## 2022-09-23 NOTE — PATIENT PROFILE ADULT - HARM RISK FACTORS
Please follow up your general surgeon regarding your abdominal pain around the ostomy site.    Please complete the lung function tests prior to seeing the lung doctors. You can call [phone] to schedule the appointment.    I have printed your labs for the endocrinologist, please let them know to send me their note and recommendations.    I will look into additional gastroenterologist that may be able to help with the ostomy output.    I will complete your [place] paperwork and get that back to you by next week.   yes

## 2023-01-01 NOTE — ED PROVIDER NOTE - ATTENDING CONTRIBUTION TO CARE
39y w/ hx of alcohol abuse, last drink 12pm yesterday here for eval of epigastric abdominal pain x1 week. Pain radiates to the back. sx's associated with diarrhea. The pt reports drinking a 12 pack of beer daily plus hard liquor. on exam pt no distress, s1s2 tachy ctab soft epigastric ttp w/o r/g. mild tremor of bl upper ext, neuro grossly unremarkable.  impression epigastric pain, in pt with hx of etoh abuse. mild withdrawals. plan for valium, labs, ivf, ct abd pelvis, ekg and re-eval
Right ear hearing screen completed date: 2023  Right ear screen method: EOAE (evoked otoacoustic emission)  Right ear screen result: Passed  Right ear screen comment: N/A    Left ear hearing screen completed date: 2023  Left ear screen method: EOAE (evoked otoacoustic emission)  Left ear screen result: Passed  Left ear screen comments: N/A

## 2024-10-25 NOTE — DIETITIAN INITIAL EVALUATION ADULT. - ENERGY INTAKE
[Good (>75%)